# Patient Record
Sex: MALE | Race: WHITE | HISPANIC OR LATINO | Employment: FULL TIME | URBAN - METROPOLITAN AREA
[De-identification: names, ages, dates, MRNs, and addresses within clinical notes are randomized per-mention and may not be internally consistent; named-entity substitution may affect disease eponyms.]

---

## 2020-02-24 ENCOUNTER — APPOINTMENT (EMERGENCY)
Dept: RADIOLOGY | Facility: HOSPITAL | Age: 25
End: 2020-02-24
Payer: COMMERCIAL

## 2020-02-24 ENCOUNTER — HOSPITAL ENCOUNTER (EMERGENCY)
Facility: HOSPITAL | Age: 25
Discharge: HOME | End: 2020-02-25
Attending: EMERGENCY MEDICINE | Admitting: EMERGENCY MEDICINE
Payer: COMMERCIAL

## 2020-02-24 ENCOUNTER — APPOINTMENT (EMERGENCY)
Dept: CT IMAGING | Facility: HOSPITAL | Age: 25
End: 2020-02-24
Payer: COMMERCIAL

## 2020-02-24 VITALS
DIASTOLIC BLOOD PRESSURE: 72 MMHG | HEART RATE: 60 BPM | RESPIRATION RATE: 18 BRPM | SYSTOLIC BLOOD PRESSURE: 129 MMHG | WEIGHT: 270.28 LBS | OXYGEN SATURATION: 98 % | TEMPERATURE: 97.9 F

## 2020-02-24 DIAGNOSIS — S16.1XXA CERVICAL STRAIN: ICD-10-CM
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
## 2020-02-24 DIAGNOSIS — T14.8XXA CONTUSION: ICD-10-CM

## 2020-02-24 LAB
ALBUMIN SERPL BCP-MCNC: 3.5 G/DL (ref 3.5–5)
ALP SERPL-CCNC: 97 U/L (ref 46–116)
ALT SERPL W P-5'-P-CCNC: 21 U/L (ref 12–78)
ANION GAP SERPL CALCULATED.3IONS-SCNC: 6 MMOL/L (ref 4–13)
APTT PPP: 34 SECONDS (ref 23–37)
AST SERPL W P-5'-P-CCNC: 34 U/L (ref 5–45)
BASOPHILS # BLD AUTO: 0.04 THOUSANDS/ΜL (ref 0–0.1)
BASOPHILS NFR BLD AUTO: 1 % (ref 0–1)
BILIRUB SERPL-MCNC: 0.4 MG/DL (ref 0.2–1)
BILIRUB UR QL STRIP: NEGATIVE
BUN SERPL-MCNC: 17 MG/DL (ref 5–25)
CALCIUM SERPL-MCNC: 8.3 MG/DL (ref 8.3–10.1)
CHLORIDE SERPL-SCNC: 103 MMOL/L (ref 100–108)
CLARITY UR: CLEAR
CO2 SERPL-SCNC: 29 MMOL/L (ref 21–32)
COLOR UR: YELLOW
CREAT SERPL-MCNC: 1.07 MG/DL (ref 0.6–1.3)
EOSINOPHIL # BLD AUTO: 0.13 THOUSAND/ΜL (ref 0–0.61)
EOSINOPHIL NFR BLD AUTO: 2 % (ref 0–6)
ERYTHROCYTE [DISTWIDTH] IN BLOOD BY AUTOMATED COUNT: 11.9 % (ref 11.6–15.1)
GFR SERPL CREATININE-BSD FRML MDRD: 95 ML/MIN/1.73SQ M
GLUCOSE SERPL-MCNC: 93 MG/DL (ref 65–140)
GLUCOSE UR STRIP-MCNC: NEGATIVE MG/DL
HCT VFR BLD AUTO: 44.6 % (ref 36.5–49.3)
HGB BLD-MCNC: 15.1 G/DL (ref 12–17)
HGB UR QL STRIP.AUTO: NEGATIVE
IMM GRANULOCYTES # BLD AUTO: 0.02 THOUSAND/UL (ref 0–0.2)
IMM GRANULOCYTES NFR BLD AUTO: 0 % (ref 0–2)
INR PPP: 1.09 (ref 0.84–1.19)
KETONES UR STRIP-MCNC: NEGATIVE MG/DL
LEUKOCYTE ESTERASE UR QL STRIP: NEGATIVE
LYMPHOCYTES # BLD AUTO: 2.76 THOUSANDS/ΜL (ref 0.6–4.47)
LYMPHOCYTES NFR BLD AUTO: 36 % (ref 14–44)
MCH RBC QN AUTO: 28.9 PG (ref 26.8–34.3)
MCHC RBC AUTO-ENTMCNC: 33.9 G/DL (ref 31.4–37.4)
MCV RBC AUTO: 85 FL (ref 82–98)
MONOCYTES # BLD AUTO: 0.49 THOUSAND/ΜL (ref 0.17–1.22)
MONOCYTES NFR BLD AUTO: 6 % (ref 4–12)
NEUTROPHILS # BLD AUTO: 4.16 THOUSANDS/ΜL (ref 1.85–7.62)
NEUTS SEG NFR BLD AUTO: 55 % (ref 43–75)
NITRITE UR QL STRIP: NEGATIVE
NRBC BLD AUTO-RTO: 0 /100 WBCS
PH UR STRIP.AUTO: 7 [PH]
PLATELET # BLD AUTO: 203 THOUSANDS/UL (ref 149–390)
PMV BLD AUTO: 10 FL (ref 8.9–12.7)
POTASSIUM SERPL-SCNC: 3.9 MMOL/L (ref 3.5–5.3)
PROT SERPL-MCNC: 7 G/DL (ref 6.4–8.2)
PROT UR STRIP-MCNC: NEGATIVE MG/DL
PROTHROMBIN TIME: 14.2 SECONDS (ref 11.6–14.5)
RBC # BLD AUTO: 5.22 MILLION/UL (ref 3.88–5.62)
SODIUM SERPL-SCNC: 138 MMOL/L (ref 136–145)
SP GR UR STRIP.AUTO: 1.01 (ref 1–1.03)
TROPONIN I SERPL-MCNC: <0.02 NG/ML
UROBILINOGEN UR QL STRIP.AUTO: 0.2 E.U./DL
WBC # BLD AUTO: 7.6 THOUSAND/UL (ref 4.31–10.16)

## 2020-02-24 PROCEDURE — 70498 CT ANGIOGRAPHY NECK: CPT

## 2020-02-24 PROCEDURE — 85730 THROMBOPLASTIN TIME PARTIAL: CPT | Performed by: PHYSICIAN ASSISTANT

## 2020-02-24 PROCEDURE — 36415 COLL VENOUS BLD VENIPUNCTURE: CPT | Performed by: PHYSICIAN ASSISTANT

## 2020-02-24 PROCEDURE — 99285 EMERGENCY DEPT VISIT HI MDM: CPT

## 2020-02-24 PROCEDURE — 96360 HYDRATION IV INFUSION INIT: CPT

## 2020-02-24 PROCEDURE — 74177 CT ABD & PELVIS W/CONTRAST: CPT

## 2020-02-24 PROCEDURE — 85610 PROTHROMBIN TIME: CPT | Performed by: PHYSICIAN ASSISTANT

## 2020-02-24 PROCEDURE — 70450 CT HEAD/BRAIN W/O DYE: CPT

## 2020-02-24 PROCEDURE — 80053 COMPREHEN METABOLIC PANEL: CPT | Performed by: PHYSICIAN ASSISTANT

## 2020-02-24 PROCEDURE — 93005 ELECTROCARDIOGRAM TRACING: CPT

## 2020-02-24 PROCEDURE — 81003 URINALYSIS AUTO W/O SCOPE: CPT | Performed by: PHYSICIAN ASSISTANT

## 2020-02-24 PROCEDURE — 72125 CT NECK SPINE W/O DYE: CPT

## 2020-02-24 PROCEDURE — 85025 COMPLETE CBC W/AUTO DIFF WBC: CPT | Performed by: PHYSICIAN ASSISTANT

## 2020-02-24 PROCEDURE — 71260 CT THORAX DX C+: CPT

## 2020-02-24 PROCEDURE — 84484 ASSAY OF TROPONIN QUANT: CPT | Performed by: PHYSICIAN ASSISTANT

## 2020-02-24 PROCEDURE — 73080 X-RAY EXAM OF ELBOW: CPT

## 2020-02-24 RX ADMIN — IOHEXOL 85 ML: 350 INJECTION, SOLUTION INTRAVENOUS at 23:20

## 2020-02-24 RX ADMIN — SODIUM CHLORIDE 1000 ML: 0.9 INJECTION, SOLUTION INTRAVENOUS at 23:14

## 2020-02-24 RX ADMIN — IOHEXOL 100 ML: 350 INJECTION, SOLUTION INTRAVENOUS at 21:25
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
## 2020-02-25 PROBLEM — T14.8XXA CONTUSION: Status: ACTIVE | Noted: 2020-02-25

## 2020-02-25 PROBLEM — S16.1XXA CERVICAL STRAIN: Status: ACTIVE | Noted: 2020-02-25

## 2020-02-25 LAB
ATRIAL RATE: 69 BPM
P AXIS: 77 DEGREES
PR INTERVAL: 164 MS
QRS AXIS: 59 DEGREES
QRSD INTERVAL: 108 MS
QT INTERVAL: 368 MS
QTC INTERVAL: 394 MS
T WAVE AXIS: 56 DEGREES
VENTRICULAR RATE: 69 BPM

## 2020-02-25 PROCEDURE — 93010 ELECTROCARDIOGRAM REPORT: CPT | Performed by: INTERNAL MEDICINE

## 2020-02-25 PROCEDURE — 99285 EMERGENCY DEPT VISIT HI MDM: CPT | Performed by: PHYSICIAN ASSISTANT

## 2020-02-25 RX ORDER — NAPROXEN 500 MG/1
500 TABLET ORAL 2 TIMES DAILY WITH MEALS
Qty: 30 TABLET | Refills: 0 | Status: SHIPPED | OUTPATIENT
Start: 2020-02-25 | End: 2020-10-13
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
## 2020-10-09 ENCOUNTER — HOSPITAL ENCOUNTER (EMERGENCY)
Facility: HOSPITAL | Age: 25
Discharge: HOME | End: 2020-10-09
Attending: EMERGENCY MEDICINE | Admitting: EMERGENCY MEDICINE
Payer: COMMERCIAL

## 2020-10-09 ENCOUNTER — APPOINTMENT (EMERGENCY)
Dept: RADIOLOGY | Facility: HOSPITAL | Age: 25
End: 2020-10-09
Payer: COMMERCIAL

## 2020-10-09 VITALS
SYSTOLIC BLOOD PRESSURE: 136 MMHG | HEART RATE: 67 BPM | RESPIRATION RATE: 16 BRPM | BODY MASS INDEX: 31.29 KG/M2 | OXYGEN SATURATION: 100 % | DIASTOLIC BLOOD PRESSURE: 76 MMHG | HEIGHT: 76 IN | TEMPERATURE: 98 F | WEIGHT: 257 LBS

## 2020-10-09 DIAGNOSIS — S42.021A CLOSED DISPLACED FRACTURE OF SHAFT OF RIGHT CLAVICLE, INITIAL ENCOUNTER: Primary | ICD-10-CM

## 2020-10-09 DIAGNOSIS — S62.024A CLOSED NONDISPLACED FRACTURE OF MIDDLE THIRD OF SCAPHOID BONE OF RIGHT WRIST, INITIAL ENCOUNTER: ICD-10-CM

## 2020-10-09 PROCEDURE — 73000 X-RAY EXAM OF COLLAR BONE: CPT

## 2020-10-09 PROCEDURE — 99283 EMERGENCY DEPT VISIT LOW MDM: CPT

## 2020-10-09 PROCEDURE — 73110 X-RAY EXAM OF WRIST: CPT

## 2020-10-09 PROCEDURE — 29125 APPL SHORT ARM SPLINT STATIC: CPT | Performed by: EMERGENCY MEDICINE

## 2020-10-09 PROCEDURE — 99284 EMERGENCY DEPT VISIT MOD MDM: CPT | Performed by: EMERGENCY MEDICINE

## 2020-10-09 PROCEDURE — 73030 X-RAY EXAM OF SHOULDER: CPT

## 2020-10-09 RX ORDER — OXYCODONE HYDROCHLORIDE AND ACETAMINOPHEN 5; 325 MG/1; MG/1
1 TABLET ORAL EVERY 4 HOURS PRN
Qty: 20 TABLET | Refills: 0 | Status: SHIPPED | OUTPATIENT
Start: 2020-10-09 | End: 2020-12-08

## 2020-10-12 ENCOUNTER — TELEPHONE (OUTPATIENT)
Dept: OBGYN CLINIC | Facility: HOSPITAL | Age: 25
End: 2020-10-12

## 2020-10-13 ENCOUNTER — ANESTHESIA EVENT (OUTPATIENT)
Dept: PERIOP | Facility: HOSPITAL | Age: 25
End: 2020-10-13
Payer: COMMERCIAL

## 2020-10-13 ENCOUNTER — OFFICE VISIT (OUTPATIENT)
Dept: OBGYN CLINIC | Facility: CLINIC | Age: 25
End: 2020-10-13
Payer: COMMERCIAL

## 2020-10-13 VITALS
SYSTOLIC BLOOD PRESSURE: 134 MMHG | DIASTOLIC BLOOD PRESSURE: 77 MMHG | WEIGHT: 258.4 LBS | RESPIRATION RATE: 18 BRPM | HEIGHT: 76 IN | BODY MASS INDEX: 31.47 KG/M2 | TEMPERATURE: 97.4 F | HEART RATE: 63 BPM

## 2020-10-13 DIAGNOSIS — M25.511 ACUTE PAIN OF RIGHT SHOULDER: ICD-10-CM

## 2020-10-13 DIAGNOSIS — S42.031A CLOSED DISPLACED FRACTURE OF ACROMIAL END OF RIGHT CLAVICLE, INITIAL ENCOUNTER: Primary | ICD-10-CM

## 2020-10-13 DIAGNOSIS — S62.021A DISPLACED FRACTURE OF MIDDLE THIRD OF NAVICULAR (SCAPHOID) BONE OF RIGHT WRIST, INITIAL ENCOUNTER FOR CLOSED FRACTURE: ICD-10-CM

## 2020-10-13 PROCEDURE — 99203 OFFICE O/P NEW LOW 30 MIN: CPT | Performed by: ORTHOPAEDIC SURGERY

## 2020-10-14 ENCOUNTER — HOSPITAL ENCOUNTER (OUTPATIENT)
Facility: HOSPITAL | Age: 25
Setting detail: OUTPATIENT SURGERY
Discharge: HOME | End: 2020-10-14
Attending: ORTHOPAEDIC SURGERY | Admitting: ORTHOPAEDIC SURGERY
Payer: COMMERCIAL

## 2020-10-14 ENCOUNTER — APPOINTMENT (OUTPATIENT)
Dept: RADIOLOGY | Facility: HOSPITAL | Age: 25
End: 2020-10-14
Payer: COMMERCIAL

## 2020-10-14 ENCOUNTER — ANESTHESIA (OUTPATIENT)
Dept: PERIOP | Facility: HOSPITAL | Age: 25
End: 2020-10-14
Payer: COMMERCIAL

## 2020-10-14 VITALS
RESPIRATION RATE: 18 BRPM | OXYGEN SATURATION: 96 % | TEMPERATURE: 97.8 F | HEART RATE: 52 BPM | SYSTOLIC BLOOD PRESSURE: 136 MMHG | DIASTOLIC BLOOD PRESSURE: 79 MMHG

## 2020-10-14 VITALS — HEART RATE: 78 BPM

## 2020-10-14 DIAGNOSIS — S62.024A CLOSED NONDISPLACED FRACTURE OF MIDDLE THIRD OF SCAPHOID BONE OF RIGHT WRIST, INITIAL ENCOUNTER: Primary | ICD-10-CM

## 2020-10-14 PROCEDURE — 73100 X-RAY EXAM OF WRIST: CPT

## 2020-10-14 PROCEDURE — C1713 ANCHOR/SCREW BN/BN,TIS/BN: HCPCS | Performed by: ORTHOPAEDIC SURGERY

## 2020-10-14 PROCEDURE — 25628 OPTX CARPL SCPHD FX INT FIXJ: CPT | Performed by: PHYSICIAN ASSISTANT

## 2020-10-14 PROCEDURE — 25628 OPTX CARPL SCPHD FX INT FIXJ: CPT | Performed by: ORTHOPAEDIC SURGERY

## 2020-10-14 DEVICE — 24.0MM, MINI ACUTRAK 2® BONE SCREW
Type: IMPLANTABLE DEVICE | Site: WRIST | Status: FUNCTIONAL
Brand: ACUMED

## 2020-10-14 RX ORDER — LIDOCAINE HYDROCHLORIDE 20 MG/ML
INJECTION, SOLUTION INFILTRATION; PERINEURAL
Status: COMPLETED | OUTPATIENT
Start: 2020-10-14 | End: 2020-10-14

## 2020-10-14 RX ORDER — SODIUM CHLORIDE, SODIUM LACTATE, POTASSIUM CHLORIDE, CALCIUM CHLORIDE 600; 310; 30; 20 MG/100ML; MG/100ML; MG/100ML; MG/100ML
125 INJECTION, SOLUTION INTRAVENOUS CONTINUOUS
Status: DISCONTINUED | OUTPATIENT
Start: 2020-10-14 | End: 2020-10-14 | Stop reason: HOSPADM

## 2020-10-14 RX ORDER — MIDAZOLAM HYDROCHLORIDE 2 MG/2ML
INJECTION, SOLUTION INTRAMUSCULAR; INTRAVENOUS
Status: COMPLETED
Start: 2020-10-14 | End: 2020-10-14

## 2020-10-14 RX ORDER — MAGNESIUM HYDROXIDE 1200 MG/15ML
LIQUID ORAL AS NEEDED
Status: DISCONTINUED | OUTPATIENT
Start: 2020-10-14 | End: 2020-10-14 | Stop reason: HOSPADM

## 2020-10-14 RX ORDER — ACETAMINOPHEN 325 MG/1
650 TABLET ORAL EVERY 6 HOURS PRN
Status: DISCONTINUED | OUTPATIENT
Start: 2020-10-14 | End: 2020-10-14 | Stop reason: HOSPADM

## 2020-10-14 RX ORDER — LIDOCAINE HYDROCHLORIDE 10 MG/ML
INJECTION, SOLUTION EPIDURAL; INFILTRATION; INTRACAUDAL; PERINEURAL
Status: COMPLETED | OUTPATIENT
Start: 2020-10-14 | End: 2020-10-14

## 2020-10-14 RX ORDER — CEFAZOLIN SODIUM 2 G/50ML
2000 SOLUTION INTRAVENOUS ONCE
Status: COMPLETED | OUTPATIENT
Start: 2020-10-14 | End: 2020-10-14

## 2020-10-14 RX ORDER — ACETAMINOPHEN 500 MG
TABLET ORAL
Qty: 30 TABLET | Refills: 0 | Status: SHIPPED | OUTPATIENT
Start: 2020-10-14 | End: 2020-12-08

## 2020-10-14 RX ORDER — ONDANSETRON 2 MG/ML
4 INJECTION INTRAMUSCULAR; INTRAVENOUS ONCE AS NEEDED
Status: DISCONTINUED | OUTPATIENT
Start: 2020-10-14 | End: 2020-10-14 | Stop reason: HOSPADM

## 2020-10-14 RX ORDER — FENTANYL CITRATE 50 UG/ML
INJECTION, SOLUTION INTRAMUSCULAR; INTRAVENOUS
Status: COMPLETED
Start: 2020-10-14 | End: 2020-10-14

## 2020-10-14 RX ORDER — ONDANSETRON 4 MG/1
4 TABLET, ORALLY DISINTEGRATING ORAL EVERY 6 HOURS PRN
Qty: 20 TABLET | Refills: 0 | Status: SHIPPED | OUTPATIENT
Start: 2020-10-14 | End: 2020-12-08

## 2020-10-14 RX ORDER — METOCLOPRAMIDE HYDROCHLORIDE 5 MG/ML
10 INJECTION INTRAMUSCULAR; INTRAVENOUS ONCE AS NEEDED
Status: DISCONTINUED | OUTPATIENT
Start: 2020-10-14 | End: 2020-10-14 | Stop reason: HOSPADM

## 2020-10-14 RX ORDER — TRAMADOL HYDROCHLORIDE 50 MG/1
50 TABLET ORAL EVERY 6 HOURS PRN
Status: DISCONTINUED | OUTPATIENT
Start: 2020-10-14 | End: 2020-10-14 | Stop reason: HOSPADM

## 2020-10-14 RX ORDER — MIDAZOLAM HYDROCHLORIDE 2 MG/2ML
INJECTION, SOLUTION INTRAMUSCULAR; INTRAVENOUS AS NEEDED
Status: DISCONTINUED | OUTPATIENT
Start: 2020-10-14 | End: 2020-10-14

## 2020-10-14 RX ORDER — ONDANSETRON 2 MG/ML
4 INJECTION INTRAMUSCULAR; INTRAVENOUS EVERY 6 HOURS PRN
Status: DISCONTINUED | OUTPATIENT
Start: 2020-10-14 | End: 2020-10-14 | Stop reason: HOSPADM

## 2020-10-14 RX ORDER — FENTANYL CITRATE 50 UG/ML
INJECTION, SOLUTION INTRAMUSCULAR; INTRAVENOUS
Status: COMPLETED | OUTPATIENT
Start: 2020-10-14 | End: 2020-10-14

## 2020-10-14 RX ORDER — FENTANYL CITRATE 50 UG/ML
INJECTION, SOLUTION INTRAMUSCULAR; INTRAVENOUS AS NEEDED
Status: DISCONTINUED | OUTPATIENT
Start: 2020-10-14 | End: 2020-10-14

## 2020-10-14 RX ORDER — ROPIVACAINE HYDROCHLORIDE 5 MG/ML
INJECTION, SOLUTION EPIDURAL; INFILTRATION; PERINEURAL
Status: COMPLETED | OUTPATIENT
Start: 2020-10-14 | End: 2020-10-14

## 2020-10-14 RX ORDER — PROPOFOL 10 MG/ML
INJECTION, EMULSION INTRAVENOUS CONTINUOUS PRN
Status: DISCONTINUED | OUTPATIENT
Start: 2020-10-14 | End: 2020-10-14

## 2020-10-14 RX ORDER — IBUPROFEN 600 MG/1
TABLET ORAL
Qty: 30 TABLET | Refills: 0 | Status: SHIPPED | OUTPATIENT
Start: 2020-10-14 | End: 2020-12-08

## 2020-10-14 RX ORDER — PROPOFOL 10 MG/ML
INJECTION, EMULSION INTRAVENOUS AS NEEDED
Status: DISCONTINUED | OUTPATIENT
Start: 2020-10-14 | End: 2020-10-14

## 2020-10-14 RX ORDER — KETAMINE HYDROCHLORIDE 50 MG/ML
INJECTION, SOLUTION, CONCENTRATE INTRAMUSCULAR; INTRAVENOUS AS NEEDED
Status: DISCONTINUED | OUTPATIENT
Start: 2020-10-14 | End: 2020-10-14

## 2020-10-14 RX ORDER — CEFAZOLIN SODIUM 2 G/50ML
2000 SOLUTION INTRAVENOUS ONCE
Status: DISCONTINUED | OUTPATIENT
Start: 2020-10-19 | End: 2020-10-14 | Stop reason: HOSPADM

## 2020-10-14 RX ORDER — MIDAZOLAM HYDROCHLORIDE 2 MG/2ML
INJECTION, SOLUTION INTRAMUSCULAR; INTRAVENOUS
Status: COMPLETED | OUTPATIENT
Start: 2020-10-14 | End: 2020-10-14

## 2020-10-14 RX ADMIN — LIDOCAINE HYDROCHLORIDE 10 ML: 20 INJECTION, SOLUTION INFILTRATION; PERINEURAL at 07:45

## 2020-10-14 RX ADMIN — SODIUM CHLORIDE, SODIUM LACTATE, POTASSIUM CHLORIDE, AND CALCIUM CHLORIDE 125 ML/HR: .6; .31; .03; .02 INJECTION, SOLUTION INTRAVENOUS at 07:45

## 2020-10-14 RX ADMIN — MIDAZOLAM HYDROCHLORIDE 2 MG: 1 INJECTION, SOLUTION INTRAMUSCULAR; INTRAVENOUS at 08:06

## 2020-10-14 RX ADMIN — FENTANYL CITRATE 100 MCG: 50 INJECTION, SOLUTION INTRAMUSCULAR; INTRAVENOUS at 07:45

## 2020-10-14 RX ADMIN — MIDAZOLAM HYDROCHLORIDE 2 MG: 1 INJECTION, SOLUTION INTRAMUSCULAR; INTRAVENOUS at 07:45

## 2020-10-14 RX ADMIN — FENTANYL CITRATE 100 MCG: 50 INJECTION, SOLUTION INTRAMUSCULAR; INTRAVENOUS at 08:06

## 2020-10-14 RX ADMIN — ROPIVACAINE HYDROCHLORIDE 20 ML: 5 INJECTION, SOLUTION EPIDURAL; INFILTRATION; PERINEURAL at 07:45

## 2020-10-14 RX ADMIN — CEFAZOLIN SODIUM 2000 MG: 2 SOLUTION INTRAVENOUS at 07:58

## 2020-10-14 RX ADMIN — LIDOCAINE HYDROCHLORIDE 3 ML: 10 INJECTION, SOLUTION EPIDURAL; INFILTRATION; INTRACAUDAL; PERINEURAL at 07:45

## 2020-10-14 RX ADMIN — PROPOFOL 100 MCG/KG/MIN: 10 INJECTION, EMULSION INTRAVENOUS at 08:37

## 2020-10-14 RX ADMIN — PROPOFOL 50 MG: 10 INJECTION, EMULSION INTRAVENOUS at 08:37

## 2020-10-14 RX ADMIN — KETAMINE HYDROCHLORIDE 20 MG: 50 INJECTION INTRAMUSCULAR; INTRAVENOUS at 08:37

## 2020-10-18 ENCOUNTER — ANESTHESIA EVENT (OUTPATIENT)
Dept: PERIOP | Facility: HOSPITAL | Age: 25
End: 2020-10-18
Payer: COMMERCIAL

## 2020-10-19 ENCOUNTER — APPOINTMENT (OUTPATIENT)
Dept: RADIOLOGY | Facility: HOSPITAL | Age: 25
End: 2020-10-19
Payer: COMMERCIAL

## 2020-10-19 ENCOUNTER — ANESTHESIA (OUTPATIENT)
Dept: PERIOP | Facility: HOSPITAL | Age: 25
End: 2020-10-19
Payer: COMMERCIAL

## 2020-10-19 ENCOUNTER — HOSPITAL ENCOUNTER (OUTPATIENT)
Facility: HOSPITAL | Age: 25
Setting detail: OUTPATIENT SURGERY
Discharge: HOME | End: 2020-10-19
Attending: ORTHOPAEDIC SURGERY | Admitting: ORTHOPAEDIC SURGERY
Payer: COMMERCIAL

## 2020-10-19 VITALS
RESPIRATION RATE: 9 BRPM | OXYGEN SATURATION: 93 % | BODY MASS INDEX: 31.44 KG/M2 | TEMPERATURE: 98.5 F | WEIGHT: 258.16 LBS | HEIGHT: 76 IN | HEART RATE: 95 BPM | SYSTOLIC BLOOD PRESSURE: 141 MMHG | DIASTOLIC BLOOD PRESSURE: 63 MMHG

## 2020-10-19 VITALS — HEART RATE: 126 BPM

## 2020-10-19 PROCEDURE — C1713 ANCHOR/SCREW BN/BN,TIS/BN: HCPCS | Performed by: ORTHOPAEDIC SURGERY

## 2020-10-19 PROCEDURE — 23515 OPTX CLAVICULAR FX W/INT FIX: CPT | Performed by: ORTHOPAEDIC SURGERY

## 2020-10-19 PROCEDURE — 73000 X-RAY EXAM OF COLLAR BONE: CPT

## 2020-10-19 PROCEDURE — 23515 OPTX CLAVICULAR FX W/INT FIX: CPT | Performed by: PHYSICIAN ASSISTANT

## 2020-10-19 DEVICE — 3.5MM CORTEX SCREW SELF-TAPPING 18MM
Type: IMPLANTABLE DEVICE | Site: CLAVICLE | Status: NON-FUNCTIONAL
Removed: 2021-05-03

## 2020-10-19 DEVICE — 3.5MM LOCKING SCREW SLF-TPNG W/STARDRIVE(TM) RECESS 10MM
Type: IMPLANTABLE DEVICE | Site: CLAVICLE | Status: NON-FUNCTIONAL
Removed: 2021-05-03

## 2020-10-19 DEVICE — 3.5MM CORTEX SCREW SELF-TAPPING 16MM
Type: IMPLANTABLE DEVICE | Site: CLAVICLE | Status: NON-FUNCTIONAL
Removed: 2021-05-03

## 2020-10-19 DEVICE — 3.5MM CORTEX SCREW SELF-TAPPING 20MM
Type: IMPLANTABLE DEVICE | Site: CLAVICLE | Status: NON-FUNCTIONAL
Removed: 2021-05-03

## 2020-10-19 DEVICE — 3.5MM LOCKING SCREW SLF-TPNG W/STARDRIVE(TM) RECESS 18MM
Type: IMPLANTABLE DEVICE | Site: CLAVICLE | Status: NON-FUNCTIONAL
Removed: 2021-05-03

## 2020-10-19 DEVICE — 3.5MM LCP CLAVICLE HOOK PL 7H 18MM HOOK DEPTH/86MM/RT-STER
Type: IMPLANTABLE DEVICE | Site: CLAVICLE | Status: NON-FUNCTIONAL
Brand: LCP
Removed: 2021-05-03

## 2020-10-19 RX ORDER — NEOSTIGMINE METHYLSULFATE 1 MG/ML
INJECTION INTRAVENOUS AS NEEDED
Status: DISCONTINUED | OUTPATIENT
Start: 2020-10-19 | End: 2020-10-19

## 2020-10-19 RX ORDER — GLYCOPYRROLATE 0.2 MG/ML
INJECTION INTRAMUSCULAR; INTRAVENOUS AS NEEDED
Status: DISCONTINUED | OUTPATIENT
Start: 2020-10-19 | End: 2020-10-19

## 2020-10-19 RX ORDER — DEXAMETHASONE SODIUM PHOSPHATE 4 MG/ML
INJECTION, SOLUTION INTRA-ARTICULAR; INTRALESIONAL; INTRAMUSCULAR; INTRAVENOUS; SOFT TISSUE AS NEEDED
Status: DISCONTINUED | OUTPATIENT
Start: 2020-10-19 | End: 2020-10-19

## 2020-10-19 RX ORDER — ROCURONIUM BROMIDE 10 MG/ML
INJECTION, SOLUTION INTRAVENOUS AS NEEDED
Status: DISCONTINUED | OUTPATIENT
Start: 2020-10-19 | End: 2020-10-19

## 2020-10-19 RX ORDER — CEFAZOLIN SODIUM 2 G/50ML
2000 SOLUTION INTRAVENOUS ONCE
Status: COMPLETED | OUTPATIENT
Start: 2020-10-19 | End: 2020-10-19

## 2020-10-19 RX ORDER — CEFAZOLIN SODIUM 1 G/3ML
INJECTION, POWDER, FOR SOLUTION INTRAMUSCULAR; INTRAVENOUS AS NEEDED
Status: DISCONTINUED | OUTPATIENT
Start: 2020-10-19 | End: 2020-10-19

## 2020-10-19 RX ORDER — SUCCINYLCHOLINE/SOD CL,ISO/PF 100 MG/5ML
SYRINGE (ML) INTRAVENOUS AS NEEDED
Status: DISCONTINUED | OUTPATIENT
Start: 2020-10-19 | End: 2020-10-19

## 2020-10-19 RX ORDER — METOCLOPRAMIDE HYDROCHLORIDE 5 MG/ML
10 INJECTION INTRAMUSCULAR; INTRAVENOUS ONCE
Status: COMPLETED | OUTPATIENT
Start: 2020-10-19 | End: 2020-10-19

## 2020-10-19 RX ORDER — LIDOCAINE HYDROCHLORIDE 10 MG/ML
0.5 INJECTION, SOLUTION EPIDURAL; INFILTRATION; INTRACAUDAL; PERINEURAL ONCE AS NEEDED
Status: DISCONTINUED | OUTPATIENT
Start: 2020-10-19 | End: 2020-10-19 | Stop reason: HOSPADM

## 2020-10-19 RX ORDER — KETAMINE HCL IN NACL, ISO-OSM 100MG/10ML
SYRINGE (ML) INJECTION AS NEEDED
Status: DISCONTINUED | OUTPATIENT
Start: 2020-10-19 | End: 2020-10-19

## 2020-10-19 RX ORDER — MEPERIDINE HYDROCHLORIDE 50 MG/ML
12.5 INJECTION INTRAMUSCULAR; INTRAVENOUS; SUBCUTANEOUS
Status: DISCONTINUED | OUTPATIENT
Start: 2020-10-19 | End: 2020-10-19 | Stop reason: HOSPADM

## 2020-10-19 RX ORDER — MAGNESIUM HYDROXIDE 1200 MG/15ML
LIQUID ORAL AS NEEDED
Status: DISCONTINUED | OUTPATIENT
Start: 2020-10-19 | End: 2020-10-19 | Stop reason: HOSPADM

## 2020-10-19 RX ORDER — ONDANSETRON 2 MG/ML
INJECTION INTRAMUSCULAR; INTRAVENOUS AS NEEDED
Status: DISCONTINUED | OUTPATIENT
Start: 2020-10-19 | End: 2020-10-19

## 2020-10-19 RX ORDER — PROPOFOL 10 MG/ML
INJECTION, EMULSION INTRAVENOUS AS NEEDED
Status: DISCONTINUED | OUTPATIENT
Start: 2020-10-19 | End: 2020-10-19

## 2020-10-19 RX ORDER — FENTANYL CITRATE 50 UG/ML
INJECTION, SOLUTION INTRAMUSCULAR; INTRAVENOUS AS NEEDED
Status: DISCONTINUED | OUTPATIENT
Start: 2020-10-19 | End: 2020-10-19

## 2020-10-19 RX ORDER — SODIUM CHLORIDE, SODIUM LACTATE, POTASSIUM CHLORIDE, CALCIUM CHLORIDE 600; 310; 30; 20 MG/100ML; MG/100ML; MG/100ML; MG/100ML
125 INJECTION, SOLUTION INTRAVENOUS CONTINUOUS
Status: DISCONTINUED | OUTPATIENT
Start: 2020-10-19 | End: 2020-10-19 | Stop reason: HOSPADM

## 2020-10-19 RX ORDER — HYDROMORPHONE HCL/PF 1 MG/ML
0.5 SYRINGE (ML) INJECTION
Status: DISCONTINUED | OUTPATIENT
Start: 2020-10-19 | End: 2020-10-19 | Stop reason: HOSPADM

## 2020-10-19 RX ORDER — FENTANYL CITRATE/PF 50 MCG/ML
25 SYRINGE (ML) INJECTION
Status: DISCONTINUED | OUTPATIENT
Start: 2020-10-19 | End: 2020-10-19 | Stop reason: HOSPADM

## 2020-10-19 RX ORDER — BUPIVACAINE HYDROCHLORIDE 2.5 MG/ML
INJECTION, SOLUTION EPIDURAL; INFILTRATION; INTRACAUDAL AS NEEDED
Status: DISCONTINUED | OUTPATIENT
Start: 2020-10-19 | End: 2020-10-19 | Stop reason: HOSPADM

## 2020-10-19 RX ORDER — MIDAZOLAM HYDROCHLORIDE 2 MG/2ML
INJECTION, SOLUTION INTRAMUSCULAR; INTRAVENOUS AS NEEDED
Status: DISCONTINUED | OUTPATIENT
Start: 2020-10-19 | End: 2020-10-19

## 2020-10-19 RX ORDER — ONDANSETRON 2 MG/ML
4 INJECTION INTRAMUSCULAR; INTRAVENOUS ONCE AS NEEDED
Status: DISCONTINUED | OUTPATIENT
Start: 2020-10-19 | End: 2020-10-19 | Stop reason: HOSPADM

## 2020-10-19 RX ADMIN — DEXAMETHASONE SODIUM PHOSPHATE 4 MG: 4 INJECTION, SOLUTION INTRAMUSCULAR; INTRAVENOUS at 08:03

## 2020-10-19 RX ADMIN — NEOSTIGMINE METHYLSULFATE 4 MG: 1 INJECTION, SOLUTION INTRAVENOUS at 11:50

## 2020-10-19 RX ADMIN — GLYCOPYRROLATE 0.8 MG: 0.2 INJECTION, SOLUTION INTRAMUSCULAR; INTRAVENOUS at 11:50

## 2020-10-19 RX ADMIN — FENTANYL CITRATE 25 MCG: 50 INJECTION INTRAMUSCULAR; INTRAVENOUS at 12:56

## 2020-10-19 RX ADMIN — ROCURONIUM BROMIDE 10 MG: 10 SOLUTION INTRAVENOUS at 09:27

## 2020-10-19 RX ADMIN — Medication 30 MG: at 08:38

## 2020-10-19 RX ADMIN — ROCURONIUM BROMIDE 20 MG: 10 SOLUTION INTRAVENOUS at 09:55

## 2020-10-19 RX ADMIN — FENTANYL CITRATE 25 MCG: 50 INJECTION, SOLUTION INTRAMUSCULAR; INTRAVENOUS at 11:34

## 2020-10-19 RX ADMIN — METOCLOPRAMIDE HYDROCHLORIDE 10 MG: 5 INJECTION INTRAMUSCULAR; INTRAVENOUS at 13:36

## 2020-10-19 RX ADMIN — SODIUM CHLORIDE, SODIUM LACTATE, POTASSIUM CHLORIDE, AND CALCIUM CHLORIDE 125 ML/HR: .6; .31; .03; .02 INJECTION, SOLUTION INTRAVENOUS at 12:00

## 2020-10-19 RX ADMIN — MIDAZOLAM HYDROCHLORIDE 2 MG: 1 INJECTION, SOLUTION INTRAMUSCULAR; INTRAVENOUS at 08:00

## 2020-10-19 RX ADMIN — ROCURONIUM BROMIDE 20 MG: 10 SOLUTION INTRAVENOUS at 08:42

## 2020-10-19 RX ADMIN — FENTANYL CITRATE 100 MCG: 50 INJECTION, SOLUTION INTRAMUSCULAR; INTRAVENOUS at 08:03

## 2020-10-19 RX ADMIN — SODIUM CHLORIDE, SODIUM LACTATE, POTASSIUM CHLORIDE, AND CALCIUM CHLORIDE: .6; .31; .03; .02 INJECTION, SOLUTION INTRAVENOUS at 09:27

## 2020-10-19 RX ADMIN — ROCURONIUM BROMIDE 30 MG: 10 SOLUTION INTRAVENOUS at 08:10

## 2020-10-19 RX ADMIN — SODIUM CHLORIDE, SODIUM LACTATE, POTASSIUM CHLORIDE, AND CALCIUM CHLORIDE: .6; .31; .03; .02 INJECTION, SOLUTION INTRAVENOUS at 07:48

## 2020-10-19 RX ADMIN — ROCURONIUM BROMIDE 10 MG: 10 SOLUTION INTRAVENOUS at 10:25

## 2020-10-19 RX ADMIN — CEFAZOLIN SODIUM 2000 MG: 2 SOLUTION INTRAVENOUS at 08:06

## 2020-10-19 RX ADMIN — ROCURONIUM BROMIDE 10 MG: 10 SOLUTION INTRAVENOUS at 10:57

## 2020-10-19 RX ADMIN — ONDANSETRON 4 MG: 2 INJECTION INTRAMUSCULAR; INTRAVENOUS at 11:27

## 2020-10-19 RX ADMIN — FENTANYL CITRATE 50 MCG: 50 INJECTION, SOLUTION INTRAMUSCULAR; INTRAVENOUS at 11:22

## 2020-10-19 RX ADMIN — Medication 20 MG: at 10:10

## 2020-10-19 RX ADMIN — CEFAZOLIN SODIUM 2000 MG: 1 INJECTION, POWDER, FOR SOLUTION INTRAMUSCULAR; INTRAVENOUS at 11:33

## 2020-10-19 RX ADMIN — ROCURONIUM BROMIDE 10 MG: 10 SOLUTION INTRAVENOUS at 10:39

## 2020-10-19 RX ADMIN — Medication 120 MG: at 08:03

## 2020-10-19 RX ADMIN — PROPOFOL 250 MG: 10 INJECTION, EMULSION INTRAVENOUS at 08:03

## 2020-10-19 RX ADMIN — ONDANSETRON 4 MG: 2 INJECTION INTRAMUSCULAR; INTRAVENOUS at 08:00

## 2020-10-22 ENCOUNTER — APPOINTMENT (OUTPATIENT)
Dept: RADIOLOGY | Facility: CLINIC | Age: 25
End: 2020-10-22
Payer: COMMERCIAL

## 2020-10-22 ENCOUNTER — OFFICE VISIT (OUTPATIENT)
Dept: OBGYN CLINIC | Facility: CLINIC | Age: 25
End: 2020-10-22

## 2020-10-22 VITALS
BODY MASS INDEX: 31.42 KG/M2 | DIASTOLIC BLOOD PRESSURE: 78 MMHG | SYSTOLIC BLOOD PRESSURE: 150 MMHG | WEIGHT: 258 LBS | HEIGHT: 76 IN | HEART RATE: 72 BPM

## 2020-10-22 DIAGNOSIS — Z51.89 AFTER CARE: ICD-10-CM

## 2020-10-22 DIAGNOSIS — S62.021D CLOSED DISPLACED FRACTURE OF MIDDLE THIRD OF SCAPHOID OF RIGHT WRIST WITH ROUTINE HEALING, SUBSEQUENT ENCOUNTER: Primary | ICD-10-CM

## 2020-10-22 DIAGNOSIS — S62.021A DISPLACED FRACTURE OF MIDDLE THIRD OF NAVICULAR (SCAPHOID) BONE OF RIGHT WRIST, INITIAL ENCOUNTER FOR CLOSED FRACTURE: ICD-10-CM

## 2020-10-22 PROCEDURE — 73110 X-RAY EXAM OF WRIST: CPT

## 2020-10-22 PROCEDURE — 99024 POSTOP FOLLOW-UP VISIT: CPT | Performed by: ORTHOPAEDIC SURGERY

## 2020-10-27 ENCOUNTER — TELEPHONE (OUTPATIENT)
Dept: OBGYN CLINIC | Facility: HOSPITAL | Age: 25
End: 2020-10-27

## 2020-11-03 ENCOUNTER — OFFICE VISIT (OUTPATIENT)
Dept: OBGYN CLINIC | Facility: CLINIC | Age: 25
End: 2020-11-03

## 2020-11-03 VITALS
TEMPERATURE: 96.8 F | SYSTOLIC BLOOD PRESSURE: 145 MMHG | BODY MASS INDEX: 31.29 KG/M2 | HEART RATE: 88 BPM | DIASTOLIC BLOOD PRESSURE: 88 MMHG | WEIGHT: 257 LBS | HEIGHT: 76 IN

## 2020-11-03 DIAGNOSIS — S42.031D CLOSED DISPLACED FRACTURE OF ACROMIAL END OF RIGHT CLAVICLE WITH ROUTINE HEALING, SUBSEQUENT ENCOUNTER: ICD-10-CM

## 2020-11-03 DIAGNOSIS — Z87.81 S/P ORIF (OPEN REDUCTION INTERNAL FIXATION) FRACTURE: Primary | ICD-10-CM

## 2020-11-03 DIAGNOSIS — Z98.890 S/P ORIF (OPEN REDUCTION INTERNAL FIXATION) FRACTURE: Primary | ICD-10-CM

## 2020-11-03 PROCEDURE — 99024 POSTOP FOLLOW-UP VISIT: CPT | Performed by: ORTHOPAEDIC SURGERY

## 2020-11-03 RX ORDER — IBUPROFEN 600 MG/1
600 TABLET ORAL EVERY 6 HOURS PRN
Qty: 30 TABLET | Refills: 0 | Status: SHIPPED | OUTPATIENT
Start: 2020-11-03 | End: 2021-01-07

## 2020-11-11 ENCOUNTER — TELEPHONE (OUTPATIENT)
Dept: PHYSICAL THERAPY | Facility: CLINIC | Age: 25
End: 2020-11-11

## 2020-11-18 ENCOUNTER — EVALUATION (OUTPATIENT)
Dept: PHYSICAL THERAPY | Facility: CLINIC | Age: 25
End: 2020-11-18
Payer: COMMERCIAL

## 2020-11-18 DIAGNOSIS — Z98.890 S/P ORIF (OPEN REDUCTION INTERNAL FIXATION) FRACTURE: ICD-10-CM

## 2020-11-18 DIAGNOSIS — Z87.81 S/P ORIF (OPEN REDUCTION INTERNAL FIXATION) FRACTURE: ICD-10-CM

## 2020-11-18 DIAGNOSIS — S42.031D CLOSED DISPLACED FRACTURE OF ACROMIAL END OF RIGHT CLAVICLE WITH ROUTINE HEALING, SUBSEQUENT ENCOUNTER: Primary | ICD-10-CM

## 2020-11-18 PROCEDURE — 97161 PT EVAL LOW COMPLEX 20 MIN: CPT | Performed by: PHYSICAL THERAPIST

## 2020-11-18 PROCEDURE — 97110 THERAPEUTIC EXERCISES: CPT | Performed by: PHYSICAL THERAPIST

## 2020-11-24 ENCOUNTER — OFFICE VISIT (OUTPATIENT)
Dept: PHYSICAL THERAPY | Facility: CLINIC | Age: 25
End: 2020-11-24
Payer: COMMERCIAL

## 2020-11-24 DIAGNOSIS — Z87.81 S/P ORIF (OPEN REDUCTION INTERNAL FIXATION) FRACTURE: ICD-10-CM

## 2020-11-24 DIAGNOSIS — S42.031D CLOSED DISPLACED FRACTURE OF ACROMIAL END OF RIGHT CLAVICLE WITH ROUTINE HEALING, SUBSEQUENT ENCOUNTER: Primary | ICD-10-CM

## 2020-11-24 DIAGNOSIS — Z98.890 S/P ORIF (OPEN REDUCTION INTERNAL FIXATION) FRACTURE: ICD-10-CM

## 2020-11-24 PROCEDURE — 97110 THERAPEUTIC EXERCISES: CPT

## 2020-11-30 DIAGNOSIS — Z98.890 S/P ORIF (OPEN REDUCTION INTERNAL FIXATION) FRACTURE: Primary | ICD-10-CM

## 2020-11-30 DIAGNOSIS — Z87.81 S/P ORIF (OPEN REDUCTION INTERNAL FIXATION) FRACTURE: Primary | ICD-10-CM

## 2020-12-01 ENCOUNTER — APPOINTMENT (OUTPATIENT)
Dept: RADIOLOGY | Facility: CLINIC | Age: 25
End: 2020-12-01
Payer: COMMERCIAL

## 2020-12-01 ENCOUNTER — OFFICE VISIT (OUTPATIENT)
Dept: OBGYN CLINIC | Facility: CLINIC | Age: 25
End: 2020-12-01

## 2020-12-01 VITALS
DIASTOLIC BLOOD PRESSURE: 80 MMHG | BODY MASS INDEX: 32.81 KG/M2 | HEIGHT: 76 IN | SYSTOLIC BLOOD PRESSURE: 146 MMHG | HEART RATE: 75 BPM | WEIGHT: 269.4 LBS

## 2020-12-01 DIAGNOSIS — Z87.81 S/P ORIF (OPEN REDUCTION INTERNAL FIXATION) FRACTURE: ICD-10-CM

## 2020-12-01 DIAGNOSIS — Z87.81 S/P ORIF (OPEN REDUCTION INTERNAL FIXATION) FRACTURE: Primary | ICD-10-CM

## 2020-12-01 DIAGNOSIS — Z98.890 S/P ORIF (OPEN REDUCTION INTERNAL FIXATION) FRACTURE: Primary | ICD-10-CM

## 2020-12-01 DIAGNOSIS — Z98.890 S/P ORIF (OPEN REDUCTION INTERNAL FIXATION) FRACTURE: ICD-10-CM

## 2020-12-01 PROCEDURE — 99024 POSTOP FOLLOW-UP VISIT: CPT | Performed by: ORTHOPAEDIC SURGERY

## 2020-12-01 PROCEDURE — 73000 X-RAY EXAM OF COLLAR BONE: CPT

## 2020-12-02 ENCOUNTER — OFFICE VISIT (OUTPATIENT)
Dept: PHYSICAL THERAPY | Facility: CLINIC | Age: 25
End: 2020-12-02
Payer: COMMERCIAL

## 2020-12-02 DIAGNOSIS — S42.031D CLOSED DISPLACED FRACTURE OF ACROMIAL END OF RIGHT CLAVICLE WITH ROUTINE HEALING, SUBSEQUENT ENCOUNTER: Primary | ICD-10-CM

## 2020-12-02 DIAGNOSIS — Z87.81 S/P ORIF (OPEN REDUCTION INTERNAL FIXATION) FRACTURE: ICD-10-CM

## 2020-12-02 DIAGNOSIS — Z98.890 S/P ORIF (OPEN REDUCTION INTERNAL FIXATION) FRACTURE: ICD-10-CM

## 2020-12-02 PROCEDURE — 97110 THERAPEUTIC EXERCISES: CPT | Performed by: PHYSICAL THERAPIST

## 2020-12-08 ENCOUNTER — OFFICE VISIT (OUTPATIENT)
Dept: OBGYN CLINIC | Facility: CLINIC | Age: 25
End: 2020-12-08

## 2020-12-08 ENCOUNTER — APPOINTMENT (OUTPATIENT)
Dept: RADIOLOGY | Facility: CLINIC | Age: 25
End: 2020-12-08
Payer: COMMERCIAL

## 2020-12-08 VITALS
BODY MASS INDEX: 32.81 KG/M2 | SYSTOLIC BLOOD PRESSURE: 147 MMHG | HEART RATE: 66 BPM | HEIGHT: 76 IN | DIASTOLIC BLOOD PRESSURE: 82 MMHG | WEIGHT: 269.4 LBS

## 2020-12-08 DIAGNOSIS — Z98.890 S/P ORIF (OPEN REDUCTION INTERNAL FIXATION) FRACTURE: ICD-10-CM

## 2020-12-08 DIAGNOSIS — Z98.890 S/P ORIF (OPEN REDUCTION INTERNAL FIXATION) FRACTURE: Primary | ICD-10-CM

## 2020-12-08 DIAGNOSIS — Z87.81 S/P ORIF (OPEN REDUCTION INTERNAL FIXATION) FRACTURE: ICD-10-CM

## 2020-12-08 DIAGNOSIS — Z87.81 S/P ORIF (OPEN REDUCTION INTERNAL FIXATION) FRACTURE: Primary | ICD-10-CM

## 2020-12-08 PROCEDURE — 99024 POSTOP FOLLOW-UP VISIT: CPT | Performed by: ORTHOPAEDIC SURGERY

## 2020-12-08 PROCEDURE — 73110 X-RAY EXAM OF WRIST: CPT

## 2020-12-09 ENCOUNTER — APPOINTMENT (OUTPATIENT)
Dept: PHYSICAL THERAPY | Facility: CLINIC | Age: 25
End: 2020-12-09
Payer: COMMERCIAL

## 2020-12-10 ENCOUNTER — OFFICE VISIT (OUTPATIENT)
Dept: PHYSICAL THERAPY | Facility: CLINIC | Age: 25
End: 2020-12-10
Payer: COMMERCIAL

## 2020-12-10 DIAGNOSIS — Z87.81 S/P ORIF (OPEN REDUCTION INTERNAL FIXATION) FRACTURE: ICD-10-CM

## 2020-12-10 DIAGNOSIS — Z98.890 S/P ORIF (OPEN REDUCTION INTERNAL FIXATION) FRACTURE: ICD-10-CM

## 2020-12-10 DIAGNOSIS — S42.031D CLOSED DISPLACED FRACTURE OF ACROMIAL END OF RIGHT CLAVICLE WITH ROUTINE HEALING, SUBSEQUENT ENCOUNTER: Primary | ICD-10-CM

## 2020-12-10 PROCEDURE — 97140 MANUAL THERAPY 1/> REGIONS: CPT

## 2020-12-10 PROCEDURE — 97110 THERAPEUTIC EXERCISES: CPT

## 2020-12-16 ENCOUNTER — APPOINTMENT (OUTPATIENT)
Dept: PHYSICAL THERAPY | Facility: CLINIC | Age: 25
End: 2020-12-16
Payer: COMMERCIAL

## 2020-12-17 ENCOUNTER — TELEPHONE (OUTPATIENT)
Dept: PHYSICAL THERAPY | Facility: CLINIC | Age: 25
End: 2020-12-17

## 2020-12-31 ENCOUNTER — TELEPHONE (OUTPATIENT)
Dept: OBGYN CLINIC | Facility: CLINIC | Age: 25
End: 2020-12-31

## 2021-01-05 ENCOUNTER — HOSPITAL ENCOUNTER (OUTPATIENT)
Dept: CT IMAGING | Facility: HOSPITAL | Age: 26
Discharge: HOME | End: 2021-01-05
Payer: COMMERCIAL

## 2021-01-05 DIAGNOSIS — Z98.890 S/P ORIF (OPEN REDUCTION INTERNAL FIXATION) FRACTURE: ICD-10-CM

## 2021-01-05 DIAGNOSIS — Z87.81 S/P ORIF (OPEN REDUCTION INTERNAL FIXATION) FRACTURE: ICD-10-CM

## 2021-01-05 PROCEDURE — 73200 CT UPPER EXTREMITY W/O DYE: CPT

## 2021-01-07 ENCOUNTER — OFFICE VISIT (OUTPATIENT)
Dept: OBGYN CLINIC | Facility: CLINIC | Age: 26
End: 2021-01-07

## 2021-01-07 VITALS
BODY MASS INDEX: 32.81 KG/M2 | SYSTOLIC BLOOD PRESSURE: 145 MMHG | DIASTOLIC BLOOD PRESSURE: 78 MMHG | HEART RATE: 70 BPM | HEIGHT: 76 IN | WEIGHT: 269.4 LBS

## 2021-01-07 DIAGNOSIS — S62.021D CLOSED DISPLACED FRACTURE OF MIDDLE THIRD OF SCAPHOID OF RIGHT WRIST WITH ROUTINE HEALING, SUBSEQUENT ENCOUNTER: ICD-10-CM

## 2021-01-07 DIAGNOSIS — Z98.890 S/P ORIF (OPEN REDUCTION INTERNAL FIXATION) FRACTURE: Primary | ICD-10-CM

## 2021-01-07 DIAGNOSIS — Z87.81 S/P ORIF (OPEN REDUCTION INTERNAL FIXATION) FRACTURE: Primary | ICD-10-CM

## 2021-01-07 PROCEDURE — 99024 POSTOP FOLLOW-UP VISIT: CPT | Performed by: ORTHOPAEDIC SURGERY

## 2021-01-07 NOTE — LETTER
January 7, 2021     Patient: Kalli Plasencia   YOB: 1995   Date of Visit: 1/7/2021       To Whom it May Concern:    Kalli Plasencia is under my professional care  He was seen in my office on 1/7/2021  He will undergo work hardening program for the next two weeks, with expected return to work beginning 1/21/2021    If you have any questions or concerns, please don't hesitate to call           Sincerely,          Shannan Waldrop MD        CC: No Recipients

## 2021-01-07 NOTE — PROGRESS NOTES
SUBJECTIVE:  Traci Vizcarra is a 30y o  year old male who presents for follow up s/p ORIF right scaphoid performed on 10/19/2020  Today patient has mild stiffness and soreness of the right wrist   He denies any recent bruising, swelling, numbness, tingling, feelings of instability, or mechanical symptoms  VITALS:  Vitals:    01/07/21 1135   BP: 145/78   Pulse: 70       PHYSICAL EXAMINATION:  General: well developed and well nourished, alert, oriented times 3 and appears comfortable  Psychiatric: Normal    MUSCULOSKELETAL EXAMINATION:  Right wrist  Incision: clean, dry and healed  Range of Motion:  Demonstrates mild stiffness with flexion extension as compared to contralateral upper extremity  Neurovascular status: Neuro intact, good cap refill, cap refill intact and radial pulse 2+      STUDIES REVIEWED:  Attending Physician has personally reviewed pertinent imaging in PACS, impression is as follows:    Review of CT series taken 1/5/2021 of the right wrist shows healed scaphoid fracture with intact hardware and no signs of loosening      PROCEDURES PERFORMED:  Procedures  No Procedures performed today      ASSESSMENT/PLAN:    S/p ORIF right scaphoid - 10/14/2020  · Patient is progressing well postoperatively  · Referral to occupational therapy for work hardening program  · Continue NSAIDs/Tylenol as needed for pain and soreness  · Provided a note to return to work beginning 1/21/2021    FOLLOW UP:  Return if symptoms worsen or fail to improve        TO DO AT NEXT VISIT:  Re-evaluation of current issue      Scribe Attestation    I,:  Raquel Roman am acting as a scribe while in the presence of the attending physician :       I,:  Joe Tripathi MD personally performed the services described in this documentation    as scribed in my presence :

## 2021-01-10 DIAGNOSIS — Z98.890 S/P ORIF (OPEN REDUCTION INTERNAL FIXATION) FRACTURE: Primary | ICD-10-CM

## 2021-01-10 DIAGNOSIS — Z87.81 S/P ORIF (OPEN REDUCTION INTERNAL FIXATION) FRACTURE: Primary | ICD-10-CM

## 2021-01-12 ENCOUNTER — APPOINTMENT (OUTPATIENT)
Dept: RADIOLOGY | Facility: CLINIC | Age: 26
End: 2021-01-12
Payer: COMMERCIAL

## 2021-01-12 ENCOUNTER — OFFICE VISIT (OUTPATIENT)
Dept: OBGYN CLINIC | Facility: CLINIC | Age: 26
End: 2021-01-12

## 2021-01-12 VITALS
BODY MASS INDEX: 33.36 KG/M2 | DIASTOLIC BLOOD PRESSURE: 80 MMHG | WEIGHT: 274 LBS | HEIGHT: 76 IN | HEART RATE: 62 BPM | SYSTOLIC BLOOD PRESSURE: 135 MMHG

## 2021-01-12 DIAGNOSIS — Z87.81 S/P ORIF (OPEN REDUCTION INTERNAL FIXATION) FRACTURE: ICD-10-CM

## 2021-01-12 DIAGNOSIS — Z87.81 S/P ORIF (OPEN REDUCTION INTERNAL FIXATION) FRACTURE: Primary | ICD-10-CM

## 2021-01-12 DIAGNOSIS — S42.031D CLOSED DISPLACED FRACTURE OF ACROMIAL END OF RIGHT CLAVICLE WITH ROUTINE HEALING, SUBSEQUENT ENCOUNTER: ICD-10-CM

## 2021-01-12 DIAGNOSIS — Z98.890 S/P ORIF (OPEN REDUCTION INTERNAL FIXATION) FRACTURE: ICD-10-CM

## 2021-01-12 DIAGNOSIS — Z98.890 S/P ORIF (OPEN REDUCTION INTERNAL FIXATION) FRACTURE: Primary | ICD-10-CM

## 2021-01-12 PROCEDURE — 99024 POSTOP FOLLOW-UP VISIT: CPT | Performed by: ORTHOPAEDIC SURGERY

## 2021-01-12 PROCEDURE — 73000 X-RAY EXAM OF COLLAR BONE: CPT

## 2021-01-12 NOTE — PATIENT INSTRUCTIONS
Weightbearing as tolerated on the right upper extremity  Call the office if you need a note clearing you for work from Dr Kelsey Deleon  Follow-up in 4 weeks, we will take new x-rays at that time

## 2021-01-12 NOTE — PROGRESS NOTES
Orthopaedics Office Visit - Post-op Patient Visit    ASSESSMENT/PLAN:    Assessment:   3 months S/P open reduction, internal fixation right clavicle fracture    Plan:   WBAT on the RUE  May continue overhead activities as tolerated, patient has been cleared for work with no restrictions by Dr Salina Bronson  Patient has attended some formal physical therapy, however his insurance will no longer cover any visits  Patient was advised that his hardware could not be removed until he is at least 6 months postop, however may have to be removed sooner rather than later if hardware begins to erode the acromion  6 months minimum, possible to 9 months  Patient may continue Motrin/Tylenol as needed for pain, ice/heat as needed for pain  Follow-up in 4 weeks with repeat xray to ensure no further erosion at acromion    To Do Next Visit:  Repeat x-rays of the right clavicle    _____________________________________________________  CHIEF COMPLAINT:  Chief Complaint   Patient presents with    Right Shoulder - Follow-up         SUBJECTIVE:  Sydnee Bowers is a 30 y o  male who presents 3 months status post open reduction internal fixation of right clavicle fracture  Patient states that his shoulder is doing well overall  He was attending formal physical therapy, however his insurance stopped covering his visits  He does notice significant improvement in his ROM  He has not been taking any medications for pain  SOCIAL HISTORY:  Social History     Tobacco Use    Smoking status: Never Smoker    Smokeless tobacco: Never Used   Substance Use Topics    Alcohol use: Yes     Frequency: Monthly or less     Comment: OCCAS    Drug use: Never       MEDICATIONS:  No current outpatient medications on file  REVIEW OF SYSTEMS:  MSK:  Right clavicle  Neuro:  Normal  Pertinent items are otherwise noted in HPI    A comprehensive review of systems was otherwise negative     _____________________________________________________  PHYSICAL EXAMINATION:  Vital signs: /80   Pulse 62   Ht 6' 4" (1 93 m)   Wt 124 kg (274 lb)   BMI 33 35 kg/m²   General: No acute distress, awake and alert  Psychiatric: Mood and affect appear appropriate  HEENT: Trachea Midline, No torticollis, no apparent facial trauma  Cardiovascular: No audible murmurs; Extremities appear perfused  Pulmonary: No audible wheezing or stridor  Skin: No open lesions; see further details (if any) below    MUSCULOSKELETAL EXAMINATION:  Extremities:  Right upper extremity  Right clavicle  Well-healed incision, no erythema  Mild tenderness over incision  ROM as expected  5/5 resisted abduction, ER  Sensation intact to upper extremity nerve distributions  Extremity is warm and well perfused    _____________________________________________________  STUDIES REVIEWED:  I personally reviewed the images and interpretation is as follows:  X-rays of the right clavicle performed on 01/12/2021 demonstrate a healed clavicle fracture in proper anatomic alignment  Hardware intact, no evidence of erosion of the acromion  PROCEDURES PERFORMED:  None performed      Scribe Attestation    I,:  Sandy Leiva am acting as a scribe while in the presence of the attending physician :       I,:  Munira Rojas MD personally performed the services described in this documentation    as scribed in my presence :

## 2021-01-14 ENCOUNTER — TELEPHONE (OUTPATIENT)
Dept: OBGYN CLINIC | Facility: HOSPITAL | Age: 26
End: 2021-01-14

## 2021-01-14 NOTE — TELEPHONE ENCOUNTER
Patient sees Dr Nan Calabrese    Patient is requesting a work note to be mailed to his address on file  Patient stated he is to return to work 1/30  His employers were requesting a work note from patient's visit with Dr Nan Calabrese

## 2021-01-15 NOTE — TELEPHONE ENCOUNTER
Called patient and left voicemail for patient to call us back to let us know if he wants letter mailed to him or faxed to employer

## 2021-02-08 DIAGNOSIS — S42.031A CLOSED DISPLACED FRACTURE OF ACROMIAL END OF RIGHT CLAVICLE, INITIAL ENCOUNTER: Primary | ICD-10-CM

## 2021-02-12 ENCOUNTER — OFFICE VISIT (OUTPATIENT)
Dept: OBGYN CLINIC | Facility: CLINIC | Age: 26
End: 2021-02-12
Payer: COMMERCIAL

## 2021-02-12 ENCOUNTER — APPOINTMENT (OUTPATIENT)
Dept: RADIOLOGY | Facility: CLINIC | Age: 26
End: 2021-02-12
Payer: COMMERCIAL

## 2021-02-12 VITALS
HEIGHT: 76 IN | SYSTOLIC BLOOD PRESSURE: 138 MMHG | WEIGHT: 274 LBS | BODY MASS INDEX: 33.36 KG/M2 | HEART RATE: 74 BPM | DIASTOLIC BLOOD PRESSURE: 84 MMHG

## 2021-02-12 DIAGNOSIS — S42.031A CLOSED DISPLACED FRACTURE OF ACROMIAL END OF RIGHT CLAVICLE, INITIAL ENCOUNTER: ICD-10-CM

## 2021-02-12 DIAGNOSIS — Z98.890 S/P ORIF (OPEN REDUCTION INTERNAL FIXATION) FRACTURE: Primary | ICD-10-CM

## 2021-02-12 DIAGNOSIS — Z87.81 S/P ORIF (OPEN REDUCTION INTERNAL FIXATION) FRACTURE: Primary | ICD-10-CM

## 2021-02-12 PROCEDURE — 73000 X-RAY EXAM OF COLLAR BONE: CPT

## 2021-02-12 PROCEDURE — 99213 OFFICE O/P EST LOW 20 MIN: CPT | Performed by: ORTHOPAEDIC SURGERY

## 2021-02-12 NOTE — PROGRESS NOTES
Orthopaedics Office Visit - Post-op Patient Visit    ASSESSMENT/PLAN:    Assessment:   4 months s/p ORIF right clavicle performed on 10/19/2020      Plan:   - Discussed conservative vs surgical intervention with patient at length  - Continue with ROM exercises of the shoulder  - Ice / ibuprofen as needed for pain  - Patient will follow up in 2 months for discussion of removal of clavicle hardware (will be 6 months out from surgery at that time)      To Do Next Visit:  Discuss surgery     _____________________________________________________  CHIEF COMPLAINT:  Chief Complaint   Patient presents with    Right Shoulder - Follow-up       SUBJECTIVE:  Dami Blood is a 30 y o  male who presents 4 months s/p ORIF right clavicle performed on 10/19/2020  Patient states that his shoulder is doing well  Patient states he continues to have mild pain over the lateral aspect of his incision site  Patient states that he has minimal pain with ROM of the shoulder  Patient denies any numbness or tingling in the arm  Patient offers no other complaints at this time  SOCIAL HISTORY:  Social History     Tobacco Use    Smoking status: Never Smoker    Smokeless tobacco: Never Used   Substance Use Topics    Alcohol use: Yes     Frequency: Monthly or less     Comment: OCCAS    Drug use: Never       MEDICATIONS:  No current outpatient medications on file  REVIEW OF SYSTEMS:  MSK: Right shoulder pain  Neuro: N/A   Pertinent items are otherwise noted in HPI  A comprehensive review of systems was otherwise negative     _____________________________________________________  PHYSICAL EXAMINATION:  Vital signs: /84   Pulse 74   Ht 6' 4" (1 93 m)   Wt 124 kg (274 lb)   BMI 33 35 kg/m²   General: No acute distress, awake and alert  Psychiatric: Mood and affect appear appropriate  HEENT: Trachea Midline, No torticollis, no apparent facial trauma  Cardiovascular: No audible murmurs;  Extremities appear perfused  Pulmonary: No audible wheezing or stridor  Skin: No open lesions; see further details (if any) below      MUSCULOSKELETAL EXAMINATION:  Right shoulder examination:  - incision site clean dry and intact with no surrounding erythema  Right extremity appears well-perfused overall  - mild tenderness palpation noted lateral clavicle  No other bony or soft tissue tenderness palpation appreciated at this time  - full range of motion of shoulder noted with no pain  - 5/5 strength noted in all planes of motion with no pain  - NV intact    _____________________________________________________  STUDIES REVIEWED:  I personally reviewed the images and interpretation is as follows:  Right clavicle XR 2 VIEWS:  Healed clavicle fracture with intact hardware present       PROCEDURES PERFORMED:  No procedures were performed at this time         Ramila Koroma PA-C - assisting    Bettina Pacheco MD

## 2021-02-12 NOTE — PATIENT INSTRUCTIONS
- Discussed conservative vs surgical intervention with patient at length  - Continue with ROM exercises of the shoulder  - Ice / ibuprofen as needed for pain  - Patient will follow up in 2 months for discussion of removal of clavicle hardware

## 2021-03-01 ENCOUNTER — TELEPHONE (OUTPATIENT)
Dept: OBGYN CLINIC | Facility: HOSPITAL | Age: 26
End: 2021-03-01

## 2021-03-01 NOTE — TELEPHONE ENCOUNTER
Sona San is calling to request that copies of his disability and return to work forms be mailed to his home address       Odilon Perales  [address] [place] [address]

## 2021-04-11 DIAGNOSIS — Z98.890 S/P ORIF (OPEN REDUCTION INTERNAL FIXATION) FRACTURE: Primary | ICD-10-CM

## 2021-04-11 DIAGNOSIS — Z87.81 S/P ORIF (OPEN REDUCTION INTERNAL FIXATION) FRACTURE: Primary | ICD-10-CM

## 2021-04-16 ENCOUNTER — APPOINTMENT (OUTPATIENT)
Dept: RADIOLOGY | Facility: CLINIC | Age: 26
End: 2021-04-16
Payer: COMMERCIAL

## 2021-04-16 ENCOUNTER — OFFICE VISIT (OUTPATIENT)
Dept: OBGYN CLINIC | Facility: CLINIC | Age: 26
End: 2021-04-16
Payer: COMMERCIAL

## 2021-04-16 VITALS
BODY MASS INDEX: 33.36 KG/M2 | HEART RATE: 69 BPM | WEIGHT: 274 LBS | DIASTOLIC BLOOD PRESSURE: 83 MMHG | SYSTOLIC BLOOD PRESSURE: 150 MMHG | HEIGHT: 76 IN

## 2021-04-16 DIAGNOSIS — Z87.81 S/P ORIF (OPEN REDUCTION INTERNAL FIXATION) FRACTURE: ICD-10-CM

## 2021-04-16 DIAGNOSIS — Z96.9 PRESENCE OF RETAINED HARDWARE: Primary | ICD-10-CM

## 2021-04-16 DIAGNOSIS — Z98.890 S/P ORIF (OPEN REDUCTION INTERNAL FIXATION) FRACTURE: ICD-10-CM

## 2021-04-16 PROCEDURE — 3008F BODY MASS INDEX DOCD: CPT | Performed by: ORTHOPAEDIC SURGERY

## 2021-04-16 PROCEDURE — 73000 X-RAY EXAM OF COLLAR BONE: CPT

## 2021-04-16 PROCEDURE — 99213 OFFICE O/P EST LOW 20 MIN: CPT | Performed by: ORTHOPAEDIC SURGERY

## 2021-04-16 RX ORDER — CHLORHEXIDINE GLUCONATE 0.12 MG/ML
15 RINSE ORAL ONCE
Status: CANCELLED | OUTPATIENT
Start: 2021-04-16 | End: 2021-04-16

## 2021-04-16 NOTE — PROGRESS NOTES
Orthopaedics Office Visit - Post-op Patient Visit    ASSESSMENT/PLAN:    Assessment:   6 months s/p ORIF right clavicle performed on 10/19/2020      Plan:   - Discussed conservative and surgical intervention with patient at length  Patient opted for surgical intervention at this time  - risks and benefits were discussed with patient at length  Procedure being performed: REMOVAL OF RETAINED CLAVICLE HARDWARE  informed consent was obtained at this time  Extensive discussion was had with patient concerning clavicle hook plates  I advised the patient that there have been patients who retained their clavicle hook plate for greater than 2 years to eventually become asymptomatic at  shoulder  However I advised the patient that retaining the clavicle hook plate bears the risk of the hook portion of the plate breaking and migrating in the shoulder, acromial attrition and or fracture over time, shoulder impingement or rotator cuff issues in the future  I also advised the patient that plate removal may result in infection, nerve and blood vessel damage, impaired limb function, we fracture of clavicle, displacement of lateral aspect of clavicle, however patient acknowledged all these risks and decided to proceed with clavicle hook plate removal     - Motrin / tylenol as needed / directed   - Ice / heat as directed   - Follow up post op       To Do Next Visit:  Sutures out, XR right clavicle     _____________________________________________________  CHIEF COMPLAINT:  Chief Complaint   Patient presents with    Right Shoulder - Follow-up         SUBJECTIVE:  Mortimer Scurry is a 30 y o  male who presents 6 months s/p ORIF right clavicle performed on 10/19/2020  Patient states that his shoulder is doing well overall  Patient states that he has regained full range of motion of the shoulder with no pain  Patient states he has minimal pain over the incision site itself    Patient states that he has not been icing or heating the shoulder  Patient denies any numbness tingling the arm  Patient offers no other complaints at this time  SOCIAL HISTORY:  Social History     Tobacco Use    Smoking status: Never Smoker    Smokeless tobacco: Never Used   Substance Use Topics    Alcohol use: Yes     Frequency: Monthly or less     Comment: OCCAS    Drug use: Never       MEDICATIONS:  No current outpatient medications on file  REVIEW OF SYSTEMS:  MSK: Right shoulder pain  Neuro: WNL   Pertinent items are otherwise noted in HPI  A comprehensive review of systems was otherwise negative     _____________________________________________________  PHYSICAL EXAMINATION:  Vital signs: /83   Pulse 69   Ht 6' 4" (1 93 m)   Wt 124 kg (274 lb)   BMI 33 35 kg/m²   General: No acute distress, awake and alert  Psychiatric: Mood and affect appear appropriate  HEENT: Trachea Midline, No torticollis, no apparent facial trauma  Cardiovascular: No audible murmurs; Extremities appear perfused  Pulmonary: No audible wheezing or stridor  Skin: No open lesions; see further details (if any) below      MUSCULOSKELETAL EXAMINATION:  Right shoulder examination:  - Patient sitting comfortably in the office in no acute distress   - incision site clean dry intact in the surrounding ortho ecchymosis  Keloid formation of scar present   - No tenderness to palpation noted at the time  - full range of motion of shoulder noted with no pain  - NV intact      _____________________________________________________  STUDIES REVIEWED:  I personally reviewed the images and interpretation is as follows:  Right clavicle XR 2 views:  Healed clavicle fracture with intact hardware present  PROCEDURES PERFORMED:  No procedures were performed at this time         Fadi Goodwin PA-C - assisting    Fabienne Quinteros MD

## 2021-04-16 NOTE — H&P (VIEW-ONLY)
Orthopaedics Office Visit - Post-op Patient Visit    ASSESSMENT/PLAN:    Assessment:   6 months s/p ORIF right clavicle performed on 10/19/2020      Plan:   - Discussed conservative and surgical intervention with patient at length  Patient opted for surgical intervention at this time  - risks and benefits were discussed with patient at length  Procedure being performed: REMOVAL OF RETAINED CLAVICLE HARDWARE  informed consent was obtained at this time  Extensive discussion was had with patient concerning clavicle hook plates  I advised the patient that there have been patients who retained their clavicle hook plate for greater than 2 years to eventually become asymptomatic at  shoulder  However I advised the patient that retaining the clavicle hook plate bears the risk of the hook portion of the plate breaking and migrating in the shoulder, acromial attrition and or fracture over time, shoulder impingement or rotator cuff issues in the future  I also advised the patient that plate removal may result in infection, nerve and blood vessel damage, impaired limb function, we fracture of clavicle, displacement of lateral aspect of clavicle, however patient acknowledged all these risks and decided to proceed with clavicle hook plate removal     - Motrin / tylenol as needed / directed   - Ice / heat as directed   - Follow up post op       To Do Next Visit:  Sutures out, XR right clavicle     _____________________________________________________  CHIEF COMPLAINT:  Chief Complaint   Patient presents with    Right Shoulder - Follow-up         SUBJECTIVE:  Marleen Diane is a 30 y o  male who presents 6 months s/p ORIF right clavicle performed on 10/19/2020  Patient states that his shoulder is doing well overall  Patient states that he has regained full range of motion of the shoulder with no pain  Patient states he has minimal pain over the incision site itself    Patient states that he has not been icing or heating the shoulder  Patient denies any numbness tingling the arm  Patient offers no other complaints at this time  SOCIAL HISTORY:  Social History     Tobacco Use    Smoking status: Never Smoker    Smokeless tobacco: Never Used   Substance Use Topics    Alcohol use: Yes     Frequency: Monthly or less     Comment: OCCAS    Drug use: Never       MEDICATIONS:  No current outpatient medications on file  REVIEW OF SYSTEMS:  MSK: Right shoulder pain  Neuro: WNL   Pertinent items are otherwise noted in HPI  A comprehensive review of systems was otherwise negative     _____________________________________________________  PHYSICAL EXAMINATION:  Vital signs: /83   Pulse 69   Ht 6' 4" (1 93 m)   Wt 124 kg (274 lb)   BMI 33 35 kg/m²   General: No acute distress, awake and alert  Psychiatric: Mood and affect appear appropriate  HEENT: Trachea Midline, No torticollis, no apparent facial trauma  Cardiovascular: No audible murmurs; Extremities appear perfused  Pulmonary: No audible wheezing or stridor  Skin: No open lesions; see further details (if any) below      MUSCULOSKELETAL EXAMINATION:  Right shoulder examination:  - Patient sitting comfortably in the office in no acute distress   - incision site clean dry intact in the surrounding ortho ecchymosis  Keloid formation of scar present   - No tenderness to palpation noted at the time  - full range of motion of shoulder noted with no pain  - NV intact      _____________________________________________________  STUDIES REVIEWED:  I personally reviewed the images and interpretation is as follows:  Right clavicle XR 2 views:  Healed clavicle fracture with intact hardware present  PROCEDURES PERFORMED:  No procedures were performed at this time         Daniella García PA-C - assisting    Jakob Soto MD

## 2021-04-29 NOTE — PRE-PROCEDURE INSTRUCTIONS
No outpatient medications have been marked as taking for [facility]  Pt is not currently taking any medications    My Surgical Experience    The following information was developed to assist you to prepare for your operation  What do I need to do before coming to the hospital?   Arrange for a responsible person to drive you to and from the hospital    Arrange care for your children at home  Children are not allowed in the recovery areas of the hospital   Plan to wear clothing that is easy to put on and take off  If you are having shoulder surgery, wear a shirt that buttons or zippers in the front  Bathing  o Shower the evening before and the morning of your surgery with an antibacterial soap  Please refer to the Pre Op Showering Instructions for Surgery Patients Sheet   o Remove nail polish and all body piercing jewelry  o Do not shave any body part for at least 24 hours before surgery-this includes face, arms, legs and upper body  Food  o Nothing to eat or drink after midnight the night before your surgery  This includes candy and chewing gum  o Exception: If your surgery is after 12:00pm (noon), you may have clear liquids such as 7-Up®, ginger ale, apple or cranberry juice, Jell-O®, water, or clear broth until 8:00 am  o Do not drink milk or juice with pulp on the morning before surgery  o Do not drink alcohol 24 hours before surgery  Medicine  o Follow instructions you received from your surgeon about which medicines you may take on the day of surgery  o If instructed to take medicine on the morning of surgery, take pills with just a small sip of water  Call your prescribing doctor for specific infroamtion on what to do if you take insulin    What should I bring to the hospital?    Bring:  Marianne Randall or a walker, if you have them, for foot or knee surgery   A list of the daily medicines, vitamins, minerals, herbals and nutritional supplements you take   Include the dosages of medicines and the time you take them each day   Glasses, dentures or hearing aids   Minimal clothing; you will be wearing hospital sleepwear   Photo ID; required to verify your identity   If you have a Living Will or Power of , bring a copy of the documents   If you have an ostomy, bring an extra pouch and any supplies you use    Do not bring   Medicines or inhalers   Money, valuables or jewelry    What other information should I know about the day of surgery?  Notify your surgeons if you develop a cold, sore throat, cough, fever, rash or any other illness   Report to the Ambulatory Surgical/Same Day Surgery Unit   You will be instructed to stop at Registration only if you have not been pre-registered   Inform your  fi they do not stay that they will be asked by the staff to leave a phone number where they can be reached   Be available to be reached before surgery  In the event the operating room schedule changes, you may be asked to come in earlier or later than expected    *It is important to tell your doctor and others involved in your health care if you are taking or have been taking any non-prescription drugs, vitamins, minerals, herbals or other nutritional supplements   Any of these may interact with some food or medicines and cause a reaction

## 2021-04-30 ENCOUNTER — ANESTHESIA EVENT (OUTPATIENT)
Dept: PERIOP | Facility: HOSPITAL | Age: 26
End: 2021-04-30
Payer: COMMERCIAL

## 2021-05-01 ENCOUNTER — IMMUNIZATIONS (OUTPATIENT)
Dept: FAMILY MEDICINE CLINIC | Facility: HOSPITAL | Age: 26
End: 2021-05-01

## 2021-05-01 DIAGNOSIS — Z23 ENCOUNTER FOR IMMUNIZATION: Primary | ICD-10-CM

## 2021-05-01 PROCEDURE — 91300 SARS-COV-2 / COVID-19 MRNA VACCINE (PFIZER-BIONTECH) 30 MCG: CPT

## 2021-05-01 PROCEDURE — 0001A SARS-COV-2 / COVID-19 MRNA VACCINE (PFIZER-BIONTECH) 30 MCG: CPT
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
## 2021-05-03 ENCOUNTER — APPOINTMENT (OUTPATIENT)
Dept: RADIOLOGY | Facility: HOSPITAL | Age: 26
End: 2021-05-03
Payer: COMMERCIAL

## 2021-05-03 ENCOUNTER — HOSPITAL ENCOUNTER (OUTPATIENT)
Facility: HOSPITAL | Age: 26
Setting detail: OUTPATIENT SURGERY
Discharge: HOME | End: 2021-05-03
Attending: ORTHOPAEDIC SURGERY | Admitting: ORTHOPAEDIC SURGERY
Payer: COMMERCIAL

## 2021-05-03 ENCOUNTER — ANESTHESIA (OUTPATIENT)
Dept: PERIOP | Facility: HOSPITAL | Age: 26
End: 2021-05-03
Payer: COMMERCIAL

## 2021-05-03 VITALS
HEIGHT: 76 IN | WEIGHT: 271.17 LBS | RESPIRATION RATE: 10 BRPM | OXYGEN SATURATION: 95 % | DIASTOLIC BLOOD PRESSURE: 70 MMHG | TEMPERATURE: 98.1 F | SYSTOLIC BLOOD PRESSURE: 132 MMHG | HEART RATE: 71 BPM | BODY MASS INDEX: 33.02 KG/M2

## 2021-05-03 DIAGNOSIS — Z96.9 PRESENCE OF RETAINED HARDWARE: Primary | ICD-10-CM

## 2021-05-03 PROCEDURE — 73000 X-RAY EXAM OF COLLAR BONE: CPT

## 2021-05-03 PROCEDURE — 20680 REMOVAL OF IMPLANT DEEP: CPT | Performed by: PHYSICIAN ASSISTANT

## 2021-05-03 PROCEDURE — 20680 REMOVAL OF IMPLANT DEEP: CPT | Performed by: ORTHOPAEDIC SURGERY

## 2021-05-03 RX ORDER — PROPOFOL 10 MG/ML
INJECTION, EMULSION INTRAVENOUS AS NEEDED
Status: DISCONTINUED | OUTPATIENT
Start: 2021-05-03 | End: 2021-05-03

## 2021-05-03 RX ORDER — NEOSTIGMINE METHYLSULFATE 1 MG/ML
INJECTION INTRAVENOUS AS NEEDED
Status: DISCONTINUED | OUTPATIENT
Start: 2021-05-03 | End: 2021-05-03

## 2021-05-03 RX ORDER — FENTANYL CITRATE/PF 50 MCG/ML
50 SYRINGE (ML) INJECTION
Status: DISCONTINUED | OUTPATIENT
Start: 2021-05-03 | End: 2021-05-03 | Stop reason: HOSPADM

## 2021-05-03 RX ORDER — LIDOCAINE HYDROCHLORIDE 10 MG/ML
INJECTION, SOLUTION EPIDURAL; INFILTRATION; INTRACAUDAL; PERINEURAL AS NEEDED
Status: DISCONTINUED | OUTPATIENT
Start: 2021-05-03 | End: 2021-05-03

## 2021-05-03 RX ORDER — OXYCODONE HYDROCHLORIDE 5 MG/1
5 TABLET ORAL EVERY 4 HOURS PRN
Status: DISCONTINUED | OUTPATIENT
Start: 2021-05-03 | End: 2021-05-03 | Stop reason: HOSPADM

## 2021-05-03 RX ORDER — ROCURONIUM BROMIDE 10 MG/ML
INJECTION, SOLUTION INTRAVENOUS AS NEEDED
Status: DISCONTINUED | OUTPATIENT
Start: 2021-05-03 | End: 2021-05-03

## 2021-05-03 RX ORDER — SODIUM CHLORIDE, SODIUM LACTATE, POTASSIUM CHLORIDE, CALCIUM CHLORIDE 600; 310; 30; 20 MG/100ML; MG/100ML; MG/100ML; MG/100ML
125 INJECTION, SOLUTION INTRAVENOUS CONTINUOUS
Status: DISCONTINUED | OUTPATIENT
Start: 2021-05-03 | End: 2021-05-03 | Stop reason: HOSPADM

## 2021-05-03 RX ORDER — MULTIVITAMIN
1 TABLET ORAL DAILY
COMMUNITY

## 2021-05-03 RX ORDER — HYDROMORPHONE HCL/PF 1 MG/ML
0.5 SYRINGE (ML) INJECTION
Status: DISCONTINUED | OUTPATIENT
Start: 2021-05-03 | End: 2021-05-03 | Stop reason: HOSPADM

## 2021-05-03 RX ORDER — GLYCOPYRROLATE 0.2 MG/ML
INJECTION INTRAMUSCULAR; INTRAVENOUS AS NEEDED
Status: DISCONTINUED | OUTPATIENT
Start: 2021-05-03 | End: 2021-05-03

## 2021-05-03 RX ORDER — MIDAZOLAM HYDROCHLORIDE 2 MG/2ML
INJECTION, SOLUTION INTRAMUSCULAR; INTRAVENOUS AS NEEDED
Status: DISCONTINUED | OUTPATIENT
Start: 2021-05-03 | End: 2021-05-03

## 2021-05-03 RX ORDER — LIDOCAINE HYDROCHLORIDE 10 MG/ML
0.5 INJECTION, SOLUTION EPIDURAL; INFILTRATION; INTRACAUDAL; PERINEURAL ONCE AS NEEDED
Status: DISCONTINUED | OUTPATIENT
Start: 2021-05-03 | End: 2021-05-03 | Stop reason: HOSPADM

## 2021-05-03 RX ORDER — CHLORHEXIDINE GLUCONATE 0.12 MG/ML
15 RINSE ORAL ONCE
Status: COMPLETED | OUTPATIENT
Start: 2021-05-03 | End: 2021-05-03

## 2021-05-03 RX ORDER — FENTANYL CITRATE 50 UG/ML
INJECTION, SOLUTION INTRAMUSCULAR; INTRAVENOUS AS NEEDED
Status: DISCONTINUED | OUTPATIENT
Start: 2021-05-03 | End: 2021-05-03

## 2021-05-03 RX ORDER — BUPIVACAINE HYDROCHLORIDE 2.5 MG/ML
INJECTION, SOLUTION EPIDURAL; INFILTRATION; INTRACAUDAL AS NEEDED
Status: DISCONTINUED | OUTPATIENT
Start: 2021-05-03 | End: 2021-05-03 | Stop reason: HOSPADM

## 2021-05-03 RX ORDER — ONDANSETRON 2 MG/ML
INJECTION INTRAMUSCULAR; INTRAVENOUS AS NEEDED
Status: DISCONTINUED | OUTPATIENT
Start: 2021-05-03 | End: 2021-05-03

## 2021-05-03 RX ORDER — OXYCODONE HYDROCHLORIDE 5 MG/1
5 TABLET ORAL EVERY 4 HOURS PRN
Qty: 15 TABLET | Refills: 0 | Status: SHIPPED | OUTPATIENT
Start: 2021-05-03 | End: 2021-05-13

## 2021-05-03 RX ORDER — MAGNESIUM HYDROXIDE 1200 MG/15ML
LIQUID ORAL AS NEEDED
Status: DISCONTINUED | OUTPATIENT
Start: 2021-05-03 | End: 2021-05-03 | Stop reason: HOSPADM

## 2021-05-03 RX ORDER — DEXAMETHASONE SODIUM PHOSPHATE 4 MG/ML
INJECTION, SOLUTION INTRA-ARTICULAR; INTRALESIONAL; INTRAMUSCULAR; INTRAVENOUS; SOFT TISSUE AS NEEDED
Status: DISCONTINUED | OUTPATIENT
Start: 2021-05-03 | End: 2021-05-03

## 2021-05-03 RX ORDER — MEPERIDINE HYDROCHLORIDE 50 MG/ML
12.5 INJECTION INTRAMUSCULAR; INTRAVENOUS; SUBCUTANEOUS
Status: DISCONTINUED | OUTPATIENT
Start: 2021-05-03 | End: 2021-05-03 | Stop reason: HOSPADM

## 2021-05-03 RX ORDER — CEFAZOLIN SODIUM 2 G/50ML
2000 SOLUTION INTRAVENOUS ONCE
Status: COMPLETED | OUTPATIENT
Start: 2021-05-03 | End: 2021-05-03

## 2021-05-03 RX ADMIN — FENTANYL CITRATE 100 MCG: 50 INJECTION, SOLUTION INTRAMUSCULAR; INTRAVENOUS at 09:30

## 2021-05-03 RX ADMIN — LIDOCAINE HYDROCHLORIDE 50 MG: 10 INJECTION, SOLUTION EPIDURAL; INFILTRATION; INTRACAUDAL; PERINEURAL at 09:30

## 2021-05-03 RX ADMIN — ROCURONIUM BROMIDE 50 MG: 10 INJECTION, SOLUTION INTRAVENOUS at 09:30

## 2021-05-03 RX ADMIN — FENTANYL CITRATE 50 MCG: 50 INJECTION, SOLUTION INTRAMUSCULAR; INTRAVENOUS at 10:36

## 2021-05-03 RX ADMIN — PROPOFOL 200 MG: 10 INJECTION, EMULSION INTRAVENOUS at 09:30

## 2021-05-03 RX ADMIN — MIDAZOLAM HYDROCHLORIDE 2 MG: 1 INJECTION, SOLUTION INTRAMUSCULAR; INTRAVENOUS at 09:26

## 2021-05-03 RX ADMIN — DEXAMETHASONE SODIUM PHOSPHATE 4 MG: 4 INJECTION, SOLUTION INTRAMUSCULAR; INTRAVENOUS at 10:09

## 2021-05-03 RX ADMIN — FENTANYL CITRATE 50 MCG: 50 INJECTION, SOLUTION INTRAMUSCULAR; INTRAVENOUS at 11:50

## 2021-05-03 RX ADMIN — ROCURONIUM BROMIDE 20 MG: 10 INJECTION, SOLUTION INTRAVENOUS at 10:03

## 2021-05-03 RX ADMIN — SODIUM CHLORIDE, SODIUM LACTATE, POTASSIUM CHLORIDE, AND CALCIUM CHLORIDE 125 ML/HR: .6; .31; .03; .02 INJECTION, SOLUTION INTRAVENOUS at 07:51

## 2021-05-03 RX ADMIN — GLYCOPYRROLATE 0.6 MG: 0.2 INJECTION, SOLUTION INTRAMUSCULAR; INTRAVENOUS at 10:49

## 2021-05-03 RX ADMIN — FENTANYL CITRATE 50 MCG: 50 INJECTION, SOLUTION INTRAMUSCULAR; INTRAVENOUS at 10:45

## 2021-05-03 RX ADMIN — CHLORHEXIDINE GLUCONATE 15 ML: 1.2 SOLUTION ORAL at 07:52

## 2021-05-03 RX ADMIN — ONDANSETRON 4 MG: 2 INJECTION INTRAMUSCULAR; INTRAVENOUS at 10:09

## 2021-05-03 RX ADMIN — FENTANYL CITRATE 50 MCG: 50 INJECTION, SOLUTION INTRAMUSCULAR; INTRAVENOUS at 11:40

## 2021-05-03 RX ADMIN — NEOSTIGMINE METHYLSULFATE 3 MG: 1 INJECTION INTRAVENOUS at 10:49

## 2021-05-03 RX ADMIN — CEFAZOLIN SODIUM 2000 MG: 2 SOLUTION INTRAVENOUS at 09:32

## 2021-05-03 RX ADMIN — HYDROMORPHONE HYDROCHLORIDE 0.5 MG: 1 INJECTION, SOLUTION INTRAMUSCULAR; INTRAVENOUS; SUBCUTANEOUS at 12:00

## 2021-05-03 RX ADMIN — FENTANYL CITRATE 50 MCG: 50 INJECTION, SOLUTION INTRAMUSCULAR; INTRAVENOUS at 10:03

## 2021-05-03 NOTE — NURSING NOTE
Patient noted to have decreased respiratory rate and apneic episodes while sleeping  O2 sat WNL and patient easily arousable  Patient able to take deep breaths when instructed  Patient offers no complaints  Anesthesia provider contacted to evaluate patient, will continue to monitor

## 2021-05-03 NOTE — INTERVAL H&P NOTE
H&P reviewed  After examining the patient I find no changes in the patients condition since the H&P had been written      Vitals:    05/03/21 0742   BP: 137/75   Pulse: 83   Resp: 21   Temp: 97 6 °F (36 4 °C)   SpO2: 97%

## 2021-05-03 NOTE — NURSING NOTE
Dr Mcmahon Overall notified of two 30 sec apneic episodes while patient sleeping as well as occasional oxygen desaturation to 88% on RA (Patient placed on 2L NC), patient easily arousable and follows instructions to take deep breaths  Dr Lisandro Verduzco at bedside to evaluate patient-will continue to monitor  Will trial off of supplemental oxygen per Dr Lisandro Verduzco and closely monitor

## 2021-05-03 NOTE — ANESTHESIA POSTPROCEDURE EVALUATION
Post-Op Assessment Note    CV Status:  Stable  Pain Score: 0    Pain management: adequate     Mental Status:  Sleepy   Hydration Status:  Stable   PONV Controlled:  Controlled   Airway Patency:  Patent and adequate   Two or more mitigation strategies used for obstructive sleep apnea   Post Op Vitals Reviewed: Yes      Staff: CRNA         No complications documented      BP      Temp      Pulse     Resp      SpO2

## 2021-05-03 NOTE — DISCHARGE INSTRUCTIONS
Discharge Instructions - Orthopedics  Kira Velez 30 y o  male MRN: [id number]  Unit/Bed#: [facility] OR MAIN    Weight Bearing Status:                                           Weight bearing as tolerated right upper extremity    DVT prophylaxis  None    Pain:  Continue analgesics as directed    Dressing Instructions: May remove dressing in 5 days to shower - allow soapy and clean water to trickle over wound with no soaking or vigorous scrubbing at any time    Appt Instructions: If you do not have your appointment, please call the clinic at [phone] to schedule follow-up appointment in 2 weeks after surgery    Contact the office sooner if you experience any increased numbness/tingling in the extremities

## 2021-05-03 NOTE — QUICK NOTE
Patient having multiple respiratory pauses in phase II  Apnea episodes are >10 sec (as long as 30s) and are associated with significant desaturations (>10%)  Patient now awake, but sleepy and drifts off during low point in the conversation  This resembles narcotic associated sleep apnea  He denies ELIESER in his history and only complains of snoring on the STOP-BANG questionnaire  Combined this gives him a score of 3/8 (Snoring =1, Gender = 1, Observed Apnea =1) which qualifies as moderate risk of sleep apnea  This cannot be qualified as sleep apnea due to the concomitate narcotic administration  He should ideally be followed up by his PCP for possible sleep study referral     Plan to discharge when no episodes of apnea for at least an hours, and no desaturations <85% on room air

## 2021-05-04 NOTE — OP NOTE
OPERATIVE REPORT  PATIENT NAME: Verena Manning    :  1995  MRN: [id number]  Pt Location: [facility] OR ROOM 03    SURGERY DATE: 5/3/2021    Surgeon(s) and Role:     * Davie You MD - Primary     * Flora Rose PA-C - Assisting    Preop Diagnosis:  Retained R shoulder clavicle hook plate    Post-Op Diagnosis Codes:  Same (hardware removed)    Procedure(s) (LRB):  REMOVAL HARDWARE RIGHT CLAVICLE HOOK PLATE (Right)    Procedure:  Removal of deep implant, R shoulder (CPT 16290)    Specimen(s):  * No specimens in log *    Estimated Blood Loss:   Minimal    Drains:  * No LDAs found *    Anesthesia Type:   General    Operative Indications:  Retained R clavicle hook plate    Operative Findings:  See below    Complications:   None    Procedure and Technique:  The patient's operative site, laterality, procedure, consent were verified in the preoperative area and the patient was transitioned to the operating room  General anesthesia was provided by the anesthesia team   After time-out for safety, old right clavicle incision was utilized and full-thickness flaps were elevated  Scar tissue was dissected down to the level of the   Clavicle plate which was easily identified  Screws and plate were removed in entirety  Removal of all hardware was verified by fluoroscopic imaging  Rongeur was used to remove small prominent bone  growth at scalloped areas  Wound was copiously irrigated with sterile saline  The right upper extremity was then taken through a stress examination under fluoroscopy  The arm was abducted and rotated and axially loaded with no noted displacement of clavicle shaft or lateral structures  Deep layer closure took place with 0 Vicryl suture in interrupted fashion  Subcutaneous layer closure took place with 2-0 Vicryl suture in interrupted fashion  Skin layer closure took place with 3-0 nylon suture in interrupted fashion  Sterile dressing consisted of Mepilex dressing    All final counts, including but not limited to instrument, sponge, needle counts were correct  I was present for the entire procedure  The patient was awakened and transitioned to the PACU in stable condition  There were no immediate complications  There was no qualified resident available for the procedure  Critical assistance from Ra Allen  Required for all components of the case including patient positioning, soft tissue retraction and passage of instrumentation  This was particularly important given the patient's BMI of 33 1    The patient may be weight bearing as tolerated right upper extremity  Suture removal will be in 2 weeks in clinic  Patient may shower after POD5        Patient Disposition:  PACU     SIGNATURE: Edilia Razo MD  DATE: May 4, 2021  TIME: 4:00 PM

## 2021-05-16 DIAGNOSIS — Z96.9 PRESENCE OF RETAINED HARDWARE: Primary | ICD-10-CM

## 2021-05-18 ENCOUNTER — OFFICE VISIT (OUTPATIENT)
Dept: OBGYN CLINIC | Facility: CLINIC | Age: 26
End: 2021-05-18

## 2021-05-18 ENCOUNTER — APPOINTMENT (OUTPATIENT)
Dept: RADIOLOGY | Facility: CLINIC | Age: 26
End: 2021-05-18
Payer: COMMERCIAL

## 2021-05-18 VITALS
SYSTOLIC BLOOD PRESSURE: 140 MMHG | HEIGHT: 76 IN | HEART RATE: 80 BPM | DIASTOLIC BLOOD PRESSURE: 84 MMHG | WEIGHT: 271 LBS | BODY MASS INDEX: 33 KG/M2

## 2021-05-18 DIAGNOSIS — Z96.9 PRESENCE OF RETAINED HARDWARE: ICD-10-CM

## 2021-05-18 DIAGNOSIS — Z98.890 S/P HARDWARE REMOVAL: Primary | ICD-10-CM

## 2021-05-18 PROCEDURE — 73000 X-RAY EXAM OF COLLAR BONE: CPT

## 2021-05-18 PROCEDURE — 99024 POSTOP FOLLOW-UP VISIT: CPT | Performed by: ORTHOPAEDIC SURGERY

## 2021-05-18 PROCEDURE — 3008F BODY MASS INDEX DOCD: CPT | Performed by: ORTHOPAEDIC SURGERY

## 2021-05-18 NOTE — PROGRESS NOTES
Orthopaedics Office Visit - Post-op Patient Visit    ASSESSMENT/PLAN:    Assessment:   Removal of hardware right clavicle hook plate 4/23/95  ORIF right clavicle performed on 10/19/2020    Plan:   WBAT RUE  Patient has full and painless RUE AROM  · Sutures removed today  · Activities as tolerated  · Patient will call when he knows his return to work date    _____________________________________________________  CHIEF COMPLAINT:  Chief Complaint   Patient presents with    Right Shoulder - Post-op         SUBJECTIVE:  Padma Edwards is a 30 y o  male who presents removal of hardware right clavicle 5/03/21  Patient states he is doing well and denies pain  He is able to complete ADLs without difficulty  No motor or sensory deficits  Pain well controlled  SOCIAL HISTORY:  Social History     Tobacco Use    Smoking status: Never Smoker    Smokeless tobacco: Never Used   Substance Use Topics    Alcohol use: Yes     Frequency: Monthly or less     Comment: OCCAS    Drug use: Never       MEDICATIONS:    Current Outpatient Medications:     Multiple Vitamin (multivitamin) tablet, Take 1 tablet by mouth daily, Disp: , Rfl:     REVIEW OF SYSTEMS:  MSK: see below  Neuro: normal  Pertinent items are otherwise noted in HPI  A comprehensive review of systems was otherwise negative     _____________________________________________________  PHYSICAL EXAMINATION:  Vital signs: /84   Pulse 80   Ht 6' 4" (1 93 m)   Wt 123 kg (271 lb)   BMI 32 99 kg/m²   General: No acute distress, awake and alert  Psychiatric: Mood and affect appear appropriate  HEENT: Trachea Midline, No torticollis, no apparent facial trauma  Cardiovascular: No audible murmurs;  Extremities appear perfused  Pulmonary: No audible wheezing or stridor  Skin: No open lesions; see further details (if any) below    MUSCULOSKELETAL EXAMINATION:  Right shoulder exam:  Right shoulder examination:  - Patient sitting comfortably in the office in no acute distress - incision site clean dry intact with no drainage (Sutures removed)   - No tenderness to palpation noted at the time  - full range of motion of shoulder noted with no pain  - Axillary, median, radial, ulnar nerve functions intact      _____________________________________________________  STUDIES REVIEWED:  I personally reviewed the images and interpretation is as follows: right clavicle x-rays demonstrates, evidence of recent hardware removal, healed fracture, no interval displacement of distal clavicle      PROCEDURES PERFORMED:  Procedures    Gerard Mcbride MD

## 2021-05-25 ENCOUNTER — TELEPHONE (OUTPATIENT)
Dept: OBGYN CLINIC | Facility: HOSPITAL | Age: 26
End: 2021-05-25

## 2021-05-26 ENCOUNTER — IMMUNIZATIONS (OUTPATIENT)
Dept: FAMILY MEDICINE CLINIC | Facility: HOSPITAL | Age: 26
End: 2021-05-26

## 2021-05-26 DIAGNOSIS — Z23 ENCOUNTER FOR IMMUNIZATION: Primary | ICD-10-CM

## 2021-05-26 PROCEDURE — 91300 SARS-COV-2 / COVID-19 MRNA VACCINE (PFIZER-BIONTECH) 30 MCG: CPT

## 2021-05-26 PROCEDURE — 0002A SARS-COV-2 / COVID-19 MRNA VACCINE (PFIZER-BIONTECH) 30 MCG: CPT

## 2021-06-08 ENCOUNTER — TELEPHONE (OUTPATIENT)
Dept: OBGYN CLINIC | Facility: CLINIC | Age: 26
End: 2021-06-08

## 2021-06-08 NOTE — TELEPHONE ENCOUNTER
Dr Slim Francisco patient    Thank you for completing [employer] disability paperwork  He is requesting a copy of the completed form he dropped off and would like you to call him once at  and ready to be picked up    Best contact #[phone]  Thank you

## 2021-06-23 NOTE — TELEPHONE ENCOUNTER
Received Return to work Form from Patient with the date to return 6/6/21  Checking with Provider on restrictions   Thank you
rolling walker (5 inch wheels)

## 2021-09-13 ENCOUNTER — TELEPHONE (OUTPATIENT)
Dept: OBGYN CLINIC | Facility: HOSPITAL | Age: 26
End: 2021-09-13

## 2021-09-13 NOTE — TELEPHONE ENCOUNTER
Patient is calling about his paperwork stating that he was suppose to return to work on 9/11/21 but the paperwork did not get back to his employer so now the return to work date will need to be changed for after the paper work is sent back to them  I let him know it was still in the que  Patient would like someone to call him before we send the paper work back to his work       [phone]

## 2021-09-14 ENCOUNTER — TELEPHONE (OUTPATIENT)
Dept: PODIATRY | Facility: CLINIC | Age: 26
End: 2021-09-14

## 2021-09-24 ENCOUNTER — OFFICE VISIT (OUTPATIENT)
Dept: OBGYN CLINIC | Facility: CLINIC | Age: 26
End: 2021-09-24

## 2021-09-24 VITALS
HEART RATE: 68 BPM | HEIGHT: 76 IN | SYSTOLIC BLOOD PRESSURE: 144 MMHG | RESPIRATION RATE: 18 BRPM | WEIGHT: 266.9 LBS | BODY MASS INDEX: 32.5 KG/M2 | DIASTOLIC BLOOD PRESSURE: 77 MMHG

## 2021-09-24 DIAGNOSIS — M51.26 BULGING LUMBAR DISC: Primary | ICD-10-CM

## 2021-09-24 DIAGNOSIS — S39.012A LUMBAR STRAIN, INITIAL ENCOUNTER: ICD-10-CM

## 2021-09-24 DIAGNOSIS — M54.16 RADICULOPATHY, LUMBAR REGION: ICD-10-CM

## 2021-09-24 PROCEDURE — 99214 OFFICE O/P EST MOD 30 MIN: CPT | Performed by: FAMILY MEDICINE

## 2021-09-24 RX ORDER — NAPROXEN 500 MG/1
500 TABLET ORAL 2 TIMES DAILY WITH MEALS
Qty: 60 TABLET | Refills: 0 | Status: SHIPPED | OUTPATIENT
Start: 2021-09-24 | End: 2022-04-27

## 2021-09-24 NOTE — PROGRESS NOTES
Assessment/Plan:  Assessment/Plan   Diagnoses and all orders for this visit:    Bulging lumbar disc  -     Ambulatory referral to Physical Therapy; Future    Radiculopathy, lumbar region  -     Ambulatory referral to Physical Therapy; Future    Lumbar strain, initial encounter  -     Ambulatory referral to Physical Therapy; Future  -     naproxen (NAPROSYN) 500 mg tablet; Take 1 tablet (500 mg total) by mouth 2 (two) times a day with meals        53-year-old active male with low back pain more than 5 years duration  Discussed with patient physical exam, imaging studies, impression and plan  CT scan abdomen pelvis from February 2020 is noted for bulging discs at L4-5 and L5-S1  Physical exam is unremarkable for midline or paraspinal tenderness  He has intact range of motion of lumbar spine  He has normal strength, sensation, and patellar reflex both lower extremities  There is no groin pain with WALT and FADDIR maneuvers of the hips  Clinical impression is that he is symptomatic from lumbar spine pathology which causes symptoms to radiate to lower extremities  I discussed treatment regimen of anti-inflammatory, supplements, topical anesthetic, and physical therapy  He is to take naproxen 500 mg twice daily with food consistently for 2 weeks and after that time take as needed  He is to start taking tumeric 500 mg twice daily, tart cherry at least 1000 mg daily, and glucosamine-chondroitin 2 to 3 times a day  He may apply topical diclofenac gel and topical CBD as needed  He is to start physical therapy as soon as possible and do home exercises as directed  He will follow up if no improvement after 5-6 weeks of formal therapy  Subjective:   Patient ID: Sammy Rodriguez is a 30 y o  male  Chief Complaint   Patient presents with    Spine - Pain       53-year-old active male presents for evaluation of low back pain of more than 5 years duration    He reports 1st having onset of pain while he is performing a dead lift  He had pain described as sudden in onset, localized to the lumbosacral aspect spine but worse on the right, throbbing and sharp, radiating to the right buttock and distally along the posterior lateral aspect of thigh to the lateral aspect of the lower leg, worse with bending and twisting, and improved with resting  He managed symptoms with rest and activity, heat, and stretching  He states that symptoms gradually improved, but over past few years he would experience flares of pain usually brought on by strenuous activity such as sudden twist or repetitive heavy lifting  He would experience pain in the low back and right lower leg that was last for few weeks at a time  He does report that in certain positions he would experience numbness in the right lower extremity  He has been using a massage gun to help with symptoms  His most recent flare was few weeks ago, but states that with resting from working out he has been without pain for more than few days  He denies any bowel or bladder incontinence  Back Pain  This is a chronic problem  The current episode started more than 1 year ago  The problem occurs intermittently  The problem has been waxing and waning  Associated symptoms include numbness  Pertinent negatives include no abdominal pain, arthralgias, chest pain, chills, fever, joint swelling, rash, sore throat or weakness  The symptoms are aggravated by twisting and bending  He has tried rest, NSAIDs, ice, heat and position changes for the symptoms  The treatment provided moderate relief  The following portions of the patient's history were reviewed and updated as appropriate: He  has a past medical history of Asthma and Hypertension  He  has a past surgical history that includes Myringotomy w/ tubes; pr open tx carpal scaphoid navicular fracture (Right, 10/14/2020);  Wrist surgery; pr open treatment clavicular fracture internal fx (Right, 10/19/2020); and pr removal deep implant (Right, 5/3/2021)  His family history includes Diabetes in his maternal aunt and maternal grandmother; Hypertension in his maternal aunt and maternal grandmother; No Known Problems in his father and mother  He  reports that he has never smoked  He has never used smokeless tobacco  He reports current alcohol use  He reports that he does not use drugs  He has No Known Allergies       Review of Systems   Constitutional: Negative for chills and fever  HENT: Negative for sore throat  Eyes: Negative for visual disturbance  Respiratory: Negative for shortness of breath  Cardiovascular: Negative for chest pain  Gastrointestinal: Negative for abdominal pain  Genitourinary: Negative for flank pain  Musculoskeletal: Positive for back pain  Negative for arthralgias and joint swelling  Skin: Negative for rash and wound  Neurological: Positive for numbness  Negative for weakness  Hematological: Does not bruise/bleed easily  Psychiatric/Behavioral: Negative for self-injury  Objective:  Vitals:    09/24/21 1510   BP: 144/77   Pulse: 68   Resp: 18   Weight: 121 kg (266 lb 14 4 oz)   Height: 6' 4" (1 93 m)     Right Ankle Exam     Muscle Strength   Dorsiflexion:  5/5  Plantar flexion:  5/5      Left Ankle Exam     Muscle Strength   Dorsiflexion:  5/5   Plantar flexion:  5/5       Right Hip Exam     Muscle Strength   Flexion: 5/5     Tests   WALT: negative    Comments:  Negative FADDIR      Left Hip Exam     Muscle Strength   Flexion: 5/5     Tests   WALT: negative    Comments:  Negative FADDIR      Back Exam     Tenderness   The patient is experiencing no tenderness  Range of Motion   The patient has normal back ROM      Muscle Strength   Right Quadriceps:  5/5   Left Quadriceps:  5/5     Tests   Straight leg raise right: negative  Straight leg raise left: negative    Reflexes   Patellar: normal    Other   Sensation: normal  Gait: normal           Strength/Myotome Testing     Left Ankle/Foot Dorsiflexion: 5  Plantar flexion: 5    Right Ankle/Foot   Dorsiflexion: 5  Plantar flexion: 5      Physical Exam  Vitals and nursing note reviewed  Constitutional:       General: He is not in acute distress  Appearance: He is well-developed  He is not ill-appearing or diaphoretic  HENT:      Head: Normocephalic and atraumatic  Right Ear: External ear normal       Left Ear: External ear normal    Eyes:      Conjunctiva/sclera: Conjunctivae normal    Neck:      Trachea: No tracheal deviation  Cardiovascular:      Rate and Rhythm: Normal rate  Pulmonary:      Effort: Pulmonary effort is normal  No respiratory distress  Abdominal:      General: There is no distension  Musculoskeletal:         General: No swelling, tenderness, deformity or signs of injury  Lumbar back: Negative right straight leg raise test and negative left straight leg raise test    Skin:     General: Skin is warm and dry  Coloration: Skin is not jaundiced or pale  Neurological:      Mental Status: He is alert and oriented to person, place, and time  Psychiatric:         Mood and Affect: Mood normal          Behavior: Behavior normal          Thought Content: Thought content normal          Judgment: Judgment normal          I have personally reviewed pertinent films in PACS and my interpretation is Bulging lumbar disc L4-L5 and L5-S1

## 2021-10-30 ENCOUNTER — OFFICE VISIT (OUTPATIENT)
Dept: URGENT CARE | Facility: CLINIC | Age: 26
End: 2021-10-30
Payer: COMMERCIAL

## 2021-10-30 VITALS
SYSTOLIC BLOOD PRESSURE: 140 MMHG | RESPIRATION RATE: 18 BRPM | HEART RATE: 71 BPM | TEMPERATURE: 98 F | OXYGEN SATURATION: 94 % | DIASTOLIC BLOOD PRESSURE: 64 MMHG | BODY MASS INDEX: 32.27 KG/M2 | WEIGHT: 265 LBS | HEIGHT: 76 IN

## 2021-10-30 DIAGNOSIS — B08.4 HAND, FOOT AND MOUTH DISEASE: Primary | ICD-10-CM

## 2021-10-30 PROCEDURE — 99213 OFFICE O/P EST LOW 20 MIN: CPT | Performed by: PHYSICIAN ASSISTANT

## 2021-11-08 ENCOUNTER — OFFICE VISIT (OUTPATIENT)
Dept: FAMILY MEDICINE CLINIC | Facility: CLINIC | Age: 26
End: 2021-11-08
Payer: COMMERCIAL

## 2021-11-08 VITALS
WEIGHT: 262 LBS | TEMPERATURE: 98.4 F | BODY MASS INDEX: 31.9 KG/M2 | HEART RATE: 72 BPM | OXYGEN SATURATION: 98 % | SYSTOLIC BLOOD PRESSURE: 138 MMHG | HEIGHT: 76 IN | DIASTOLIC BLOOD PRESSURE: 78 MMHG

## 2021-11-08 DIAGNOSIS — B08.4 HAND, FOOT AND MOUTH DISEASE (HFMD): ICD-10-CM

## 2021-11-08 DIAGNOSIS — Z13.1 SCREENING FOR DIABETES MELLITUS: ICD-10-CM

## 2021-11-08 DIAGNOSIS — Z00.00 HEALTH MAINTENANCE EXAMINATION: Primary | ICD-10-CM

## 2021-11-08 DIAGNOSIS — Z13.220 SCREENING FOR LIPID DISORDERS: ICD-10-CM

## 2021-11-08 PROCEDURE — 3725F SCREEN DEPRESSION PERFORMED: CPT | Performed by: PHYSICIAN ASSISTANT

## 2021-11-08 PROCEDURE — 1036F TOBACCO NON-USER: CPT | Performed by: PHYSICIAN ASSISTANT

## 2021-11-08 PROCEDURE — 3008F BODY MASS INDEX DOCD: CPT | Performed by: PHYSICIAN ASSISTANT

## 2021-11-08 PROCEDURE — 99385 PREV VISIT NEW AGE 18-39: CPT | Performed by: PHYSICIAN ASSISTANT

## 2021-11-29 ENCOUNTER — TELEPHONE (OUTPATIENT)
Dept: FAMILY MEDICINE CLINIC | Facility: CLINIC | Age: 26
End: 2021-11-29

## 2021-11-29 DIAGNOSIS — G44.89 OTHER HEADACHE SYNDROME: Primary | ICD-10-CM

## 2021-11-30 PROCEDURE — U0005 INFEC AGEN DETEC AMPLI PROBE: HCPCS | Performed by: PHYSICIAN ASSISTANT

## 2021-11-30 PROCEDURE — U0003 INFECTIOUS AGENT DETECTION BY NUCLEIC ACID (DNA OR RNA); SEVERE ACUTE RESPIRATORY SYNDROME CORONAVIRUS 2 (SARS-COV-2) (CORONAVIRUS DISEASE [COVID-19]), AMPLIFIED PROBE TECHNIQUE, MAKING USE OF HIGH THROUGHPUT TECHNOLOGIES AS DESCRIBED BY CMS-2020-01-R: HCPCS | Performed by: PHYSICIAN ASSISTANT

## 2021-12-01 LAB — SARS-COV-2 RNA RESP QL NAA+PROBE: NEGATIVE

## 2021-12-28 ENCOUNTER — NURSE TRIAGE (OUTPATIENT)
Dept: OTHER | Facility: OTHER | Age: 26
End: 2021-12-28

## 2021-12-28 DIAGNOSIS — U07.1 COVID-19: ICD-10-CM

## 2021-12-28 DIAGNOSIS — Z20.822 SUSPECTED SEVERE ACUTE RESPIRATORY SYNDROME CORONAVIRUS 2 (SARS-COV-2) INFECTION: Primary | ICD-10-CM

## 2021-12-29 PROCEDURE — 87636 SARSCOV2 & INF A&B AMP PRB: CPT | Performed by: PHYSICIAN ASSISTANT

## 2022-01-04 LAB
FLUAV RNA RESP QL NAA+PROBE: NEGATIVE
FLUBV RNA RESP QL NAA+PROBE: NEGATIVE
SARS-COV-2 RNA RESP QL NAA+PROBE: NEGATIVE

## 2022-04-25 ENCOUNTER — TELEPHONE (OUTPATIENT)
Dept: FAMILY MEDICINE CLINIC | Facility: CLINIC | Age: 27
End: 2022-04-25

## 2022-04-25 NOTE — TELEPHONE ENCOUNTER
Pt has an appt Wed, re back pain and is not able to work today or tomorrow    He does have paperwork for Wilmar Mckeon to complete so is asking if Wilmar Mckeon would back date the paper work to include today & tomorrow ? ??

## 2022-04-27 ENCOUNTER — OFFICE VISIT (OUTPATIENT)
Dept: FAMILY MEDICINE CLINIC | Facility: CLINIC | Age: 27
End: 2022-04-27
Payer: COMMERCIAL

## 2022-04-27 VITALS
DIASTOLIC BLOOD PRESSURE: 78 MMHG | WEIGHT: 270 LBS | SYSTOLIC BLOOD PRESSURE: 126 MMHG | BODY MASS INDEX: 32.88 KG/M2 | TEMPERATURE: 98.4 F | HEIGHT: 76 IN | OXYGEN SATURATION: 97 % | HEART RATE: 64 BPM

## 2022-04-27 DIAGNOSIS — M54.41 ACUTE RIGHT-SIDED LOW BACK PAIN WITH RIGHT-SIDED SCIATICA: Primary | ICD-10-CM

## 2022-04-27 PROCEDURE — 1036F TOBACCO NON-USER: CPT | Performed by: PHYSICIAN ASSISTANT

## 2022-04-27 PROCEDURE — 99214 OFFICE O/P EST MOD 30 MIN: CPT | Performed by: PHYSICIAN ASSISTANT

## 2022-04-27 RX ORDER — METHOCARBAMOL 750 MG/1
750 TABLET, FILM COATED ORAL EVERY 6 HOURS PRN
Qty: 20 TABLET | Refills: 0 | Status: SHIPPED | OUTPATIENT
Start: 2022-04-27

## 2022-04-27 RX ORDER — NAPROXEN 500 MG/1
500 TABLET ORAL 2 TIMES DAILY WITH MEALS
Qty: 30 TABLET | Refills: 0 | Status: SHIPPED | OUTPATIENT
Start: 2022-04-27
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
## 2022-05-11 ENCOUNTER — EVALUATION (OUTPATIENT)
Dept: PHYSICAL THERAPY | Facility: CLINIC | Age: 27
End: 2022-05-11
Payer: COMMERCIAL

## 2022-05-11 DIAGNOSIS — M54.41 ACUTE RIGHT-SIDED LOW BACK PAIN WITH RIGHT-SIDED SCIATICA: Primary | ICD-10-CM

## 2022-05-11 PROCEDURE — 97162 PT EVAL MOD COMPLEX 30 MIN: CPT | Performed by: PHYSICAL THERAPIST

## 2022-05-11 PROCEDURE — 97110 THERAPEUTIC EXERCISES: CPT | Performed by: PHYSICAL THERAPIST
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
## 2022-05-19 ENCOUNTER — OFFICE VISIT (OUTPATIENT)
Dept: PHYSICAL THERAPY | Facility: CLINIC | Age: 27
End: 2022-05-19
Payer: COMMERCIAL

## 2022-05-19 DIAGNOSIS — M54.41 ACUTE RIGHT-SIDED LOW BACK PAIN WITH RIGHT-SIDED SCIATICA: Primary | ICD-10-CM

## 2022-05-19 PROCEDURE — 97112 NEUROMUSCULAR REEDUCATION: CPT | Performed by: PHYSICAL THERAPIST

## 2022-05-19 PROCEDURE — 97140 MANUAL THERAPY 1/> REGIONS: CPT | Performed by: PHYSICAL THERAPIST

## 2022-05-19 NOTE — PROGRESS NOTES
Daily Note     Today's date: 2022  Patient name: Jim Pearson  : 1995  MRN: [id number]  Referring provider: Marisol Candelaria PA-C  Dx:   Encounter Diagnosis     ICD-10-CM    1  Acute right-sided low back pain with right-sided sciatica  M54 41        Start Time: 1515  Stop Time: 1615  Total time in clinic (min): 60 minutes    Subjective: Patient reports that he has felt a little better when performing his lumbar extension  Patient has noticed intermittent pain below his knee on the right  Objective: See treatment diary below      Assessment: Tolerated treatment well  Patient demonstrated fatigue post treatment and would benefit from continued PT  Patient challenged with progressions to strengthening and neuromuscular reeducation  Patient reported feeling better at end of session  Good understanding of current HEP  Plan: Continue per plan of care  Progress treatment as tolerated  Precautions: HTN      Manuals            Gr  II-IV central lumbar PA mobs  GR           R sciatic nerve glides  GR           R piriformis str  R piriformis pin and stretch  GR           Neuro Re-Ed             Supine sciatic nerve glides at 90/90 2x20 Reviewed           Behzad: Standing multifidus press  15# 10x10" ea  Behzad: Standing multifidus lifts  15# 10x10"           Standing multifidus chops             Quadruped alt UE/LE                                       Ther Ex             Patient education: postural education, pathophysiology, HEP             Warm-up  TM 10 min           PPU vs  Standing lumbar extension x10/hr Reviewed           Piriformis str  3x30" Reviewed           Figure-4 bridge  2x10 5" ea  Prone hip ext  2x10 5" ea             Clamshells                                       Ther Activity                                       Gait Training                                       Modalities

## 2022-05-24 NOTE — PROGRESS NOTES
Daily Note     Today's date: 2022  Patient name: Rufina Denver  : 1995  MRN: [id number]  Referring provider: Laz Vidales PA-C  Dx:   Encounter Diagnosis     ICD-10-CM    1  Acute right-sided low back pain with right-sided sciatica  M54 39        Start Time: 1530  Stop Time: 1620  Total time in clinic (min): 50 minutes    Subjective: Patient reports that his back feels a little better, but he hasn't done anything strenuous  Patient had some minor hip discomfort when stretching his piriformis  Minor tightness in his right lower leg currently  Objective: See treatment diary below      Assessment: Tolerated treatment well  Patient demonstrated fatigue post treatment and would benefit from continued PT  Patient challenged with progressions to treatment program, reporting increased fatigue/soreness  No pain provocation  Plan: Continue per plan of care  Progress treatment as tolerated  Precautions: HTN      Manuals           Gr  II-IV central lumbar PA mobs  GR GR          R sciatic nerve glides  GR GR          R piriformis str  R piriformis pin and stretch  GR           Neuro Re-Ed             Supine sciatic nerve glides at 90/90 2x20 Reviewed           [place]: Standing multifidus press  15# 10x10" ea  Behzad: Standing multifidus lifts  15# 10x10"           Standing multifidus chops             Quadruped alt UE/LE   10x3"          Multifidus walk-outs   15# 15x5" R ; 10x5" L                         Ther Ex             Patient education: postural education, pathophysiology, HEP   GR HEP review          Warm-up  TM 10 min TM 10 min          PPU vs  Standing lumbar extension x10/hr Reviewed           Piriformis str  3x30" Reviewed           Figure-4 bridge  2x10 5" ea  Prone hip ext  2x10 5" ea  Clamshells   10x10" ea                                      Ther Activity                                       Gait Training Modalities                                       Access Code: [email or web address]  URL: [email or web address]  Date: 05/25/2022  Prepared by: Jeanne Po    Exercises  · Prone Press Up - 5-8 x daily - 7 x weekly - 1 sets - 10 reps - 3 seconds hold  · Standing Lumbar Extension - 5-8 x daily - 7 x weekly - 1 sets - 10 reps - 3 seconds hold  · Supine Piriformis Stretch with Foot on Ground - 2 x daily - 7 x weekly - 3 sets - 1 reps - 30 seconds hold  · Supine Sciatic Nerve Glide - 2 x daily - 7 x weekly - 3 sets - 1 reps - 30 seconds hold  · Standing Anti-Rotation Press with Anchored Resistance - 1 x daily - 3 x weekly - 2 sets - 10 reps - 5 seconds hold  · Anti-Rotation Lateral Stepping with Press - 1 x daily - 4 x weekly - 2 sets - 10 reps - 5 seconds hold  · Reverse Chop with Resistance - 1 x daily - 4 x weekly - 2 sets - 10 reps - 5 seconds hold  · Figure 4 Bridge - 1 x daily - 4 x weekly - 2 sets - 10 reps - 5 seconds hold  · Bird Dog - 1 x daily - 4 x weekly - 2 sets - 10 reps - 5 seconds hold  · Prone Hip Extension - 1 x daily - 7 x weekly - 3 sets - 10 reps  · Clamshell - 1 x daily - 7 x weekly - 3 sets - 10 reps

## 2022-05-25 ENCOUNTER — OFFICE VISIT (OUTPATIENT)
Dept: PHYSICAL THERAPY | Facility: CLINIC | Age: 27
End: 2022-05-25
Payer: COMMERCIAL

## 2022-05-25 DIAGNOSIS — M54.41 ACUTE RIGHT-SIDED LOW BACK PAIN WITH RIGHT-SIDED SCIATICA: Primary | ICD-10-CM

## 2022-05-25 PROCEDURE — 97110 THERAPEUTIC EXERCISES: CPT | Performed by: PHYSICAL THERAPIST

## 2022-05-25 PROCEDURE — 97112 NEUROMUSCULAR REEDUCATION: CPT | Performed by: PHYSICAL THERAPIST

## 2022-05-25 PROCEDURE — 97140 MANUAL THERAPY 1/> REGIONS: CPT | Performed by: PHYSICAL THERAPIST

## 2022-06-08 ENCOUNTER — OFFICE VISIT (OUTPATIENT)
Dept: PHYSICAL THERAPY | Facility: CLINIC | Age: 27
End: 2022-06-08
Payer: COMMERCIAL

## 2022-06-08 DIAGNOSIS — M54.41 ACUTE RIGHT-SIDED LOW BACK PAIN WITH RIGHT-SIDED SCIATICA: Primary | ICD-10-CM

## 2022-06-08 PROCEDURE — 97112 NEUROMUSCULAR REEDUCATION: CPT | Performed by: PHYSICAL THERAPIST

## 2022-06-08 NOTE — PROGRESS NOTES
Daily Note     Today's date: 2022  Patient name: Wil Brandon  : 1995  MRN: [id number]  Referring provider: Ariane Bhandari PA-C  Dx:   Encounter Diagnosis     ICD-10-CM    1  Acute right-sided low back pain with right-sided sciatica  M54 39        Start Time: 1523  Stop Time: 1625  Total time in clinic (min): 62 minutes    Subjective: Patient reports that he was unable to perform his HEP as much as he would have liked while on vacation  Patient still gets sore at the end of his work day and is a little stiff in the morning  Patient feels close to back to his normal self  Patient would like to reduce frequency of visits to 2x/month to wean to HEP only  Objective: See treatment diary below      Assessment: Tolerated treatment well  Patient demonstrated fatigue post treatment and would benefit from continued PT  Patient challenged with progression to treatment program  Patient with core weakness as demonstrated by fair lumbar control during prone and side planks  Plan: Continue per plan of care  Progress note during next visit  Progress treatment as tolerated  Precautions: HTN      Manuals          Gr  II-IV central lumbar PA mobs  GR GR          R sciatic nerve glides  GR GR          R piriformis str  R piriformis pin and stretch  GR           Reassessment    NV         Neuro Re-Ed             Supine sciatic nerve glides at 90/90 2x20 Reviewed           [place]: Standing multifidus press  15# 10x10" ea  Behzad: Standing multifidus lifts  15# 10x10"           Standing multifidus chops             Quadruped alt UE/LE   10x3" 2x10 3"         Multifidus walk-outs   15# 15x5" R ; 10x5" L   15# 15x5" ea  Dead bug    2x10 5"         Prone planks    5x20"         Side planks    5x20" ea                        Ther Ex             Patient education: postural education, pathophysiology, HEP   GR HEP review          Warm-up  TM 10 min TM 10 min TM 10 min         PPU vs  Standing lumbar extension x10/hr Reviewed           Piriformis str  3x30" Reviewed           Figure-4 bridge  2x10 5" ea  Prone hip ext  2x10 5" ea  Clamshells   10x10" ea  Reviewed                                   Ther Activity                                       Gait Training                                       Modalities                                       Access Code: [email or web address]  URL: [email or web address]  Date: 05/25/2022  Prepared by: Seven Fossa    Exercises  · Prone Press Up - 5-8 x daily - 7 x weekly - 1 sets - 10 reps - 3 seconds hold  · Standing Lumbar Extension - 5-8 x daily - 7 x weekly - 1 sets - 10 reps - 3 seconds hold  · Supine Piriformis Stretch with Foot on Ground - 2 x daily - 7 x weekly - 3 sets - 1 reps - 30 seconds hold  · Supine Sciatic Nerve Glide - 2 x daily - 7 x weekly - 3 sets - 1 reps - 30 seconds hold  · Standing Anti-Rotation Press with Anchored Resistance - 1 x daily - 3 x weekly - 2 sets - 10 reps - 5 seconds hold  · Anti-Rotation Lateral Stepping with Press - 1 x daily - 4 x weekly - 2 sets - 10 reps - 5 seconds hold  · Reverse Chop with Resistance - 1 x daily - 4 x weekly - 2 sets - 10 reps - 5 seconds hold  · Figure 4 Bridge - 1 x daily - 4 x weekly - 2 sets - 10 reps - 5 seconds hold  · Bird Dog - 1 x daily - 4 x weekly - 2 sets - 10 reps - 5 seconds hold  · Prone Hip Extension - 1 x daily - 7 x weekly - 3 sets - 10 reps  · Clamshell - 1 x daily - 7 x weekly - 3 sets - 10 reps

## 2022-06-15 ENCOUNTER — APPOINTMENT (OUTPATIENT)
Dept: PHYSICAL THERAPY | Facility: CLINIC | Age: 27
End: 2022-06-15
Payer: COMMERCIAL

## 2022-06-29 ENCOUNTER — APPOINTMENT (OUTPATIENT)
Dept: PHYSICAL THERAPY | Facility: CLINIC | Age: 27
End: 2022-06-29
Payer: COMMERCIAL

## 2022-06-29 NOTE — PROGRESS NOTES
Daily Note     Today's date: 2022  Patient name: Maggie Weber  : 1995  MRN: [id number]  Referring provider: Scott Polk PA-C  Dx:   Encounter Diagnosis     ICD-10-CM    1  Acute right-sided low back pain with right-sided sciatica  M54 39                   Subjective: ***      Objective: See treatment diary below      Assessment: Tolerated treatment well  Patient demonstrated fatigue post treatment and would benefit from continued PT  Plan: Continue per plan of care  Progress treatment as tolerated  Precautions: HTN      Manuals         Gr  II-IV central lumbar PA mobs  GR GR          R sciatic nerve glides  GR GR          R piriformis str  R piriformis pin and stretch  GR           Reassessment    NV         Neuro Re-Ed             Supine sciatic nerve glides at 90/90 2x20 Reviewed           [place]: Standing multifidus press  15# 10x10" ea  Behzad: Standing multifidus lifts  15# 10x10"           Standing multifidus chops             Quadruped alt UE/LE   10x3" 2x10 3"         Multifidus walk-outs   15# 15x5" R ; 10x5" L   15# 15x5" ea  Dead bug    2x10 5"         Prone planks    5x20"         Side planks    5x20" ea  Ther Ex             Patient education: postural education, pathophysiology, HEP   GR HEP review          Warm-up  TM 10 min TM 10 min TM 10 min         PPU vs  Standing lumbar extension x10/hr Reviewed           Piriformis str  3x30" Reviewed           Figure-4 bridge  2x10 5" ea  Prone hip ext  2x10 5" ea  Clamshells   10x10" ea  Reviewed                                   Ther Activity                                       Gait Training                                       Modalities                                       Access Code: [email or web address]  URL: [email or web address]  Date: 2022  Prepared by: Henry Shipley    Exercises  · Prone Press Up - 5-8 x daily - 7 x weekly - 1 sets - 10 reps - 3 seconds hold  · Standing Lumbar Extension - 5-8 x daily - 7 x weekly - 1 sets - 10 reps - 3 seconds hold  · Supine Piriformis Stretch with Foot on Ground - 2 x daily - 7 x weekly - 3 sets - 1 reps - 30 seconds hold  · Supine Sciatic Nerve Glide - 2 x daily - 7 x weekly - 3 sets - 1 reps - 30 seconds hold  · Standing Anti-Rotation Press with Anchored Resistance - 1 x daily - 3 x weekly - 2 sets - 10 reps - 5 seconds hold  · Anti-Rotation Lateral Stepping with Press - 1 x daily - 4 x weekly - 2 sets - 10 reps - 5 seconds hold  · Reverse Chop with Resistance - 1 x daily - 4 x weekly - 2 sets - 10 reps - 5 seconds hold  · Figure 4 Bridge - 1 x daily - 4 x weekly - 2 sets - 10 reps - 5 seconds hold  · Bird Dog - 1 x daily - 4 x weekly - 2 sets - 10 reps - 5 seconds hold  · Prone Hip Extension - 1 x daily - 7 x weekly - 3 sets - 10 reps  · Clamshell - 1 x daily - 7 x weekly - 3 sets - 10 reps

## 2022-07-13 NOTE — PROGRESS NOTES
Yazan Santiago has attended a total of 4 physical therapy appointments to date  Patient was last treated on 6/8/22 and has no remaining appointments scheduled  Patient cancelled or no showed his past 3 appointments  Goals, objective and subjective information unable to be updated at this time  Patient will be discharged from physical therapy secondary to inactivity

## 2022-08-24 ENCOUNTER — OFFICE VISIT (OUTPATIENT)
Dept: FAMILY MEDICINE CLINIC | Facility: CLINIC | Age: 27
End: 2022-08-24
Payer: COMMERCIAL

## 2022-08-24 VITALS
OXYGEN SATURATION: 98 % | BODY MASS INDEX: 33 KG/M2 | WEIGHT: 271 LBS | HEART RATE: 72 BPM | DIASTOLIC BLOOD PRESSURE: 82 MMHG | SYSTOLIC BLOOD PRESSURE: 142 MMHG | TEMPERATURE: 97.4 F | HEIGHT: 76 IN

## 2022-08-24 DIAGNOSIS — M62.830 SPASM OF MUSCLE OF LOWER BACK: Primary | ICD-10-CM

## 2022-08-24 PROCEDURE — 99214 OFFICE O/P EST MOD 30 MIN: CPT | Performed by: PHYSICIAN ASSISTANT

## 2022-08-24 RX ORDER — METHOCARBAMOL 750 MG/1
750 TABLET, FILM COATED ORAL EVERY 8 HOURS PRN
Qty: 30 TABLET | Refills: 0 | Status: SHIPPED | OUTPATIENT
Start: 2022-08-24
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
## 2022-10-04 ENCOUNTER — APPOINTMENT (OUTPATIENT)
Dept: RADIOLOGY | Facility: CLINIC | Age: 27
End: 2022-10-04
Payer: COMMERCIAL

## 2022-10-04 ENCOUNTER — OFFICE VISIT (OUTPATIENT)
Dept: FAMILY MEDICINE CLINIC | Facility: CLINIC | Age: 27
End: 2022-10-04
Payer: COMMERCIAL

## 2022-10-04 VITALS
SYSTOLIC BLOOD PRESSURE: 131 MMHG | OXYGEN SATURATION: 96 % | HEART RATE: 70 BPM | DIASTOLIC BLOOD PRESSURE: 87 MMHG | TEMPERATURE: 97.5 F | BODY MASS INDEX: 33 KG/M2 | WEIGHT: 271 LBS | HEIGHT: 76 IN

## 2022-10-04 DIAGNOSIS — G89.29 CHRONIC BILATERAL LOW BACK PAIN WITHOUT SCIATICA: ICD-10-CM

## 2022-10-04 DIAGNOSIS — G89.29 CHRONIC BILATERAL LOW BACK PAIN WITHOUT SCIATICA: Primary | ICD-10-CM

## 2022-10-04 DIAGNOSIS — M54.50 CHRONIC BILATERAL LOW BACK PAIN WITHOUT SCIATICA: ICD-10-CM

## 2022-10-04 DIAGNOSIS — M54.50 CHRONIC BILATERAL LOW BACK PAIN WITHOUT SCIATICA: Primary | ICD-10-CM

## 2022-10-04 PROCEDURE — 72110 X-RAY EXAM L-2 SPINE 4/>VWS: CPT

## 2022-10-04 PROCEDURE — 99214 OFFICE O/P EST MOD 30 MIN: CPT | Performed by: PHYSICIAN ASSISTANT

## 2022-10-04 RX ORDER — NAPROXEN 500 MG/1
500 TABLET ORAL 2 TIMES DAILY WITH MEALS
Qty: 30 TABLET | Refills: 0 | Status: SHIPPED | OUTPATIENT
Start: 2022-10-04

## 2022-10-04 NOTE — PROGRESS NOTES
Name: Salvador Godinez      : 1995      MRN: [id number]  Encounter Provider: Zaida Hernandez PA-C  Encounter Date: 10/4/2022   Encounter department: [address] [place], [place] [address]     1  Chronic bilateral low back pain without sciatica  -     XR spine lumbar minimum 4 views non injury; Future; Expected date: 10/04/2022  -     MRI lumbar spine wo contrast; Future; Expected date: 10/04/2022  -     naproxen (Naprosyn) 500 mg tablet; Take 1 tablet (500 mg total) by mouth 2 (two) times a day with meals  chronic low back pain, no definitively responding to PT, HEP, conservative measures including heat, massage, nsaids  Will seek MRI at this time  Naproxen prn, continue home conservative measures  Further PT, vs ortho, vs pain management depending on imaging results  Subjective     Back Pain  This is a chronic problem  The current episode started more than 1 year ago  The problem occurs daily  The problem has been waxing and waning since onset  The quality of the pain is described as aching, stabbing and shooting  The pain radiates to the right thigh  The pain is moderate  The pain is worse during the day  The symptoms are aggravated by bending, sitting, standing and twisting  Stiffness is present all day  Pertinent negatives include no abdominal pain, bladder incontinence, bowel incontinence, dysuria, leg pain, numbness or weakness  He has tried analgesics, bed rest, heat, home exercises, NSAIDs and muscle relaxant (PT) for the symptoms  The treatment provided mild relief  Review of Systems   Constitutional: Negative  Respiratory: Negative  Gastrointestinal: Negative for abdominal pain and bowel incontinence  Genitourinary: Negative for bladder incontinence and dysuria  Musculoskeletal: Positive for back pain  Neurological: Negative for weakness and numbness         Past Medical History:   Diagnosis Date    Asthma     Hypertension      Past Surgical History: Procedure Laterality Date    MYRINGOTOMY W/ TUBES      NE OPEN TREATMENT CLAVICULAR FRACTURE INTERNAL FX Right 10/19/2020    Procedure: OPEN REDUCTION W/ INTERNAL FIXATION (ORIF) RIGHT CLAVICLE FRACTURE;  Surgeon: Kortney Kang MD;  [facility];  Service: Orthopedics    NE OPEN [address] Right 10/14/2020    Procedure: OPEN REDUCTION W/ INTERNAL FIXATION (ORIF) RIGHT SCAPHOID FRACTURE;  Surgeon: Shelia Rob MD;  [facility];  Service: Orthopedics    NE REMOVAL DEEP IMPLANT Right 5/3/2021    Procedure: REMOVAL HARDWARE RIGHT CLAVICLE [address];  Surgeon: Kortney Kang MD;  [facility];  Service: Orthopedics    WRIST SURGERY       Family History   Problem Relation Age of Onset    No Known Problems Mother     No Known Problems Father     Diabetes Maternal Aunt     Hypertension Maternal Aunt     Diabetes Maternal Grandmother     Hypertension Maternal Grandmother      Social History     Socioeconomic History    Marital status: Single     Spouse name: None    Number of children: None    Years of education: None    Highest education level: None   Occupational History    None   Tobacco Use    Smoking status: Never Smoker    Smokeless tobacco: Never Used   Vaping Use    Vaping Use: Never used   Substance and Sexual Activity    Alcohol use: Yes     Comment: OCCAS    Drug use: Never    Sexual activity: None   Other Topics Concern    None   Social History Narrative    None     Social Determinants of Health     Financial Resource Strain: Not on file   Food Insecurity: Not on file   Transportation Needs: Not on file   Physical Activity: Not on file   Stress: Not on file   Social Connections: Not on file   Intimate Partner Violence: Not on file   Housing Stability: Not on file     Current Outpatient Medications on File Prior to Visit   Medication Sig    methocarbamol (Robaxin-750) 750 mg tablet Take 1 tablet (750 mg total) by mouth every 8 (eight) hours as needed for muscle spasms    Multiple Vitamin (multivitamin) tablet Take 1 tablet by mouth daily       No Known Allergies  Immunization History   Administered Date(s) Administered    COVID-19 PFIZER VACCINE 0 3 ML IM 05/01/2021, 05/26/2021       Objective     /87   Pulse 70   Temp 97 5 °F (36 4 °C)   Ht 6' 4" (1 93 m)   Wt 123 kg (271 lb)   SpO2 96%   BMI 32 99 kg/m²     Physical Exam  Vitals and nursing note reviewed  Constitutional:       General: He is not in acute distress  Appearance: Normal appearance  He is not ill-appearing  HENT:      Head: Normocephalic and atraumatic  Pulmonary:      Effort: Pulmonary effort is normal  No respiratory distress  Musculoskeletal:      Cervical back: Normal range of motion  Lumbar back: No tenderness or bony tenderness  Normal range of motion  Negative right straight leg raise test and negative left straight leg raise test    Neurological:      Mental Status: He is alert and oriented to person, place, and time  Sensory: No sensory deficit  Motor: No weakness        Deep Tendon Reflexes: Reflexes normal        Michael Ramirez PA-C

## 2022-10-10 DIAGNOSIS — G89.29 CHRONIC BILATERAL LOW BACK PAIN WITHOUT SCIATICA: Primary | ICD-10-CM

## 2022-10-10 DIAGNOSIS — M54.50 CHRONIC BILATERAL LOW BACK PAIN WITHOUT SCIATICA: Primary | ICD-10-CM

## 2022-12-13 ENCOUNTER — OFFICE VISIT (OUTPATIENT)
Dept: FAMILY MEDICINE CLINIC | Facility: CLINIC | Age: 27
End: 2022-12-13

## 2022-12-13 VITALS
TEMPERATURE: 96.9 F | OXYGEN SATURATION: 97 % | SYSTOLIC BLOOD PRESSURE: 132 MMHG | WEIGHT: 277 LBS | DIASTOLIC BLOOD PRESSURE: 88 MMHG | HEIGHT: 76 IN | BODY MASS INDEX: 33.73 KG/M2 | HEART RATE: 72 BPM

## 2022-12-13 DIAGNOSIS — R03.0 ELEVATED BLOOD PRESSURE READING WITHOUT DIAGNOSIS OF HYPERTENSION: Primary | ICD-10-CM

## 2022-12-13 NOTE — PROGRESS NOTES
Name: Juan Erickson      : 1995      MRN: [id number]  Encounter Provider: Nghia Dockery PA-C  Encounter Date: 2022   Encounter department: [address] [place] [address]     1  Elevated blood pressure reading without diagnosis of hypertension  BP normotensive today  Recommend home monitoring with proper cuff size  No end organ complaints  Follow up prn  Goal <140/90       Subjective     Pt presents for BP check  Was at dentist and BP was in the 150s/90s so dental work was deferred  No hx of HTN  Few previous office readings above goal  No end organ complaints  BP today is 132/88    Review of Systems   Constitutional: Negative for chills, fatigue and fever  HENT: Negative for congestion, ear pain, hearing loss, nosebleeds, postnasal drip, rhinorrhea, sinus pressure, sinus pain, sneezing and sore throat  Eyes: Negative for pain, discharge, itching and visual disturbance  Respiratory: Negative for cough, chest tightness, shortness of breath and wheezing  Cardiovascular: Negative for chest pain, palpitations and leg swelling  Gastrointestinal: Negative for abdominal pain, blood in stool, constipation, diarrhea, nausea and vomiting  Genitourinary: Negative for frequency and urgency  Neurological: Negative for dizziness, light-headedness and numbness         Past Medical History:   Diagnosis Date   • Asthma    • Hypertension      Past Surgical History:   Procedure Laterality Date   • MYRINGOTOMY W/ TUBES     • MO OPEN TREATMENT CLAVICULAR FRACTURE INTERNAL FX Right 10/19/2020    Procedure: OPEN REDUCTION W/ INTERNAL FIXATION (ORIF) RIGHT CLAVICLE FRACTURE;  Surgeon: Darlyn Bolivar MD;  [facility];  Service: Orthopedics   • MO OPEN [address] Right 10/14/2020    Procedure: OPEN REDUCTION W/ INTERNAL FIXATION (ORIF) RIGHT SCAPHOID FRACTURE;  Surgeon: Alem Tomlin MD;  [facility];  Service: Orthopedics   • MO REMOVAL DEEP IMPLANT Right 5/3/2021    Procedure: REMOVAL HARDWARE RIGHT CLAVICLE HOOK PLATE;  Surgeon: Vidhya Figueredo MD;  [facility];  Service: Orthopedics   • WRIST SURGERY       Family History   Problem Relation Age of Onset   • No Known Problems Mother    • No Known Problems Father    • Diabetes Maternal Aunt    • Hypertension Maternal Aunt    • Diabetes Maternal Grandmother    • Hypertension Maternal Grandmother      Social History     Socioeconomic History   • Marital status: Single     Spouse name: None   • Number of children: None   • Years of education: None   • Highest education level: None   Occupational History   • None   Tobacco Use   • Smoking status: Never   • Smokeless tobacco: Never   Vaping Use   • Vaping Use: Never used   Substance and Sexual Activity   • Alcohol use: Yes     Comment: OCCAS   • Drug use: Never   • Sexual activity: None   Other Topics Concern   • None   Social History Narrative   • None     Social Determinants of Health     Financial Resource Strain: Not on file   Food Insecurity: Not on file   Transportation Needs: Not on file   Physical Activity: Not on file   Stress: Not on file   Social Connections: Not on file   Intimate Partner Violence: Not on file   Housing Stability: Not on file     Current Outpatient Medications on File Prior to Visit   Medication Sig   • methocarbamol (Robaxin-750) 750 mg tablet Take 1 tablet (750 mg total) by mouth every 8 (eight) hours as needed for muscle spasms   • Multiple Vitamin (multivitamin) tablet Take 1 tablet by mouth daily     • naproxen (Naprosyn) 500 mg tablet Take 1 tablet (500 mg total) by mouth 2 (two) times a day with meals     No Known Allergies  Immunization History   Administered Date(s) Administered   • COVID-19 PFIZER VACCINE 0 3 ML IM 05/01/2021, 05/26/2021       Objective     /88   Pulse 72   Temp (!) 96 9 °F (36 1 °C)   Ht 6' 4" (1 93 m)   Wt 126 kg (277 lb)   SpO2 97%   BMI 33 72 kg/m²     Physical Exam  Vitals and nursing note reviewed  Constitutional:       General: He is not in acute distress  Appearance: Normal appearance  HENT:      Head: Normocephalic and atraumatic  Nose: Nose normal       Mouth/Throat:      Mouth: Mucous membranes are moist       Pharynx: Oropharynx is clear  No oropharyngeal exudate or posterior oropharyngeal erythema  Eyes:      Pupils: Pupils are equal, round, and reactive to light  Cardiovascular:      Rate and Rhythm: Normal rate and regular rhythm  Heart sounds: Normal heart sounds  No murmur heard  Pulmonary:      Effort: Pulmonary effort is normal  No respiratory distress  Breath sounds: Normal breath sounds  No wheezing, rhonchi or rales  Musculoskeletal:         General: Normal range of motion  Cervical back: Normal range of motion and neck supple  Skin:     General: Skin is warm and dry  Neurological:      Mental Status: He is alert and oriented to person, place, and time     Psychiatric:         Mood and Affect: Mood and affect normal        Nghia Dockery PA-C

## 2023-01-17 ENCOUNTER — OFFICE VISIT (OUTPATIENT)
Dept: FAMILY MEDICINE CLINIC | Facility: CLINIC | Age: 28
End: 2023-01-17

## 2023-01-17 VITALS
WEIGHT: 285 LBS | DIASTOLIC BLOOD PRESSURE: 84 MMHG | TEMPERATURE: 97 F | HEIGHT: 76 IN | BODY MASS INDEX: 34.7 KG/M2 | HEART RATE: 68 BPM | SYSTOLIC BLOOD PRESSURE: 130 MMHG | OXYGEN SATURATION: 97 %

## 2023-01-17 DIAGNOSIS — Z13.1 SCREENING FOR DIABETES MELLITUS: ICD-10-CM

## 2023-01-17 DIAGNOSIS — M54.50 CHRONIC BILATERAL LOW BACK PAIN WITHOUT SCIATICA: Primary | ICD-10-CM

## 2023-01-17 DIAGNOSIS — I45.9 SKIPPED HEART BEATS: ICD-10-CM

## 2023-01-17 DIAGNOSIS — R03.0 ELEVATED BLOOD PRESSURE READING WITHOUT DIAGNOSIS OF HYPERTENSION: ICD-10-CM

## 2023-01-17 DIAGNOSIS — R82.998 FOAMY URINE: ICD-10-CM

## 2023-01-17 DIAGNOSIS — R00.2 PALPITATIONS: ICD-10-CM

## 2023-01-17 DIAGNOSIS — Z13.220 SCREENING FOR LIPID DISORDERS: ICD-10-CM

## 2023-01-17 DIAGNOSIS — G89.29 CHRONIC BILATERAL LOW BACK PAIN WITHOUT SCIATICA: Primary | ICD-10-CM

## 2023-01-17 NOTE — PROGRESS NOTES
Name: Jeanette Cruz      : 1995      MRN: [id number]  Encounter Provider: Farrah Kwon PA-C  Encounter Date: 2023   Encounter department: [address] [place] [address]     1  Chronic bilateral low back pain without sciatica    2  Skipped heart beats  -     TSH, 3rd generation with Free T4 reflex; Future  -     CBC and differential; Future    3  Screening for lipid disorders  -     Lipid Panel with Direct LDL reflex; Future    4  Screening for diabetes mellitus  -     Comprehensive metabolic panel; Future    5  Foamy urine  -     UA (URINE) with reflex to Scope    6  Elevated blood pressure reading without diagnosis of hypertension  normotensive today  Place holter  RRR on exam today  No associated cardiac complaints with the sensation of the heart skipping beats with exercise  Update labs, UA  Foamy urine without other urinary complaints  Monitor for proteinuria  Continue conservative measures for back pain  Continue routine follow ups  Subjective     Pt presents for follow up and a few questions  MRI review, low back pain, intermittent sciatic sx  MRI showed lumbar degenerative changes most pronounced at L4/5  No severe canal/foraminal stenosis  Pain reasonably well controlled with conservative measures  He questions that sometimes when he is greatly exerting himself during exercise he can feel his heart skip a beat "for a second"  No lightheadedness, CP, BINGHAM, syncope  No palpitations at rest      He also shares his urine has been "foamy" without other urinary sx  BP today on recheck is 130/84    Review of Systems   Constitutional: Negative for chills, fatigue and fever  HENT: Negative for congestion, ear pain, hearing loss, nosebleeds, postnasal drip, rhinorrhea, sinus pressure, sinus pain, sneezing and sore throat  Eyes: Negative for pain, discharge, itching and visual disturbance     Respiratory: Negative for cough, chest tightness, shortness of breath and wheezing  Cardiovascular: Positive for palpitations  Negative for chest pain and leg swelling  Gastrointestinal: Negative for abdominal pain, blood in stool, constipation, diarrhea, nausea and vomiting  Genitourinary: Negative for frequency and urgency  Foamy urine   Musculoskeletal: Positive for back pain  Neurological: Negative for dizziness, light-headedness and numbness         Past Medical History:   Diagnosis Date   • Asthma    • Hypertension      Past Surgical History:   Procedure Laterality Date   • MYRINGOTOMY W/ TUBES     • [place] OPEN TX CARPAL SCAPHOID NAVICULAR FRACTURE Right 10/14/2020    Procedure: OPEN REDUCTION W/ INTERNAL FIXATION (ORIF) RIGHT SCAPHOID FRACTURE;  Surgeon: Mk Resendiz MD;  [facility];  Service: Orthopedics   • PA OPEN TX CLAVICULAR FRACTURE INTERNAL FIXATION Right 10/19/2020    Procedure: OPEN REDUCTION W/ INTERNAL FIXATION (ORIF) RIGHT CLAVICLE FRACTURE;  Surgeon: Sasha Weinberg MD;  [facility];  Service: Orthopedics   • [place] REMOVAL IMPLANT DEEP Right 5/3/2021    Procedure: REMOVAL HARDWARE RIGHT CLAVICLE [address];  Surgeon: Sasha Weinberg MD;  [facility];  Service: Orthopedics   • WRIST SURGERY       Family History   Problem Relation Age of Onset   • No Known Problems Mother    • No Known Problems Father    • Diabetes Maternal Aunt    • Hypertension Maternal Aunt    • Diabetes Maternal Grandmother    • Hypertension Maternal Grandmother      Social History     Socioeconomic History   • Marital status: Single     Spouse name: None   • Number of children: None   • Years of education: None   • Highest education level: None   Occupational History   • None   Tobacco Use   • Smoking status: Never   • Smokeless tobacco: Never   Vaping Use   • Vaping Use: Never used   Substance and Sexual Activity   • Alcohol use: Yes     Comment: OCCAS   • Drug use: Never   • Sexual activity: None   Other Topics Concern   • None   Social History Narrative   • None     Social Determinants of Health     Financial Resource Strain: Not on file   Food Insecurity: Not on file   Transportation Needs: Not on file   Physical Activity: Not on file   Stress: Not on file   Social Connections: Not on file   Intimate Partner Violence: Not on file   Housing Stability: Not on file     Current Outpatient Medications on File Prior to Visit   Medication Sig   • methocarbamol (Robaxin-750) 750 mg tablet Take 1 tablet (750 mg total) by mouth every 8 (eight) hours as needed for muscle spasms   • Multiple Vitamin (multivitamin) tablet Take 1 tablet by mouth daily     • naproxen (Naprosyn) 500 mg tablet Take 1 tablet (500 mg total) by mouth 2 (two) times a day with meals     No Known Allergies  Immunization History   Administered Date(s) Administered   • COVID-19 PFIZER VACCINE 0 3 ML IM 05/01/2021, 05/26/2021       Objective     /84   Pulse 68   Temp (!) 97 °F (36 1 °C)   Ht 6' 4" (1 93 m)   Wt 129 kg (285 lb)   SpO2 97%   BMI 34 69 kg/m²     Physical Exam  Vitals and nursing note reviewed  Constitutional:       General: He is not in acute distress  Appearance: Normal appearance  HENT:      Head: Normocephalic and atraumatic  Nose: Nose normal       Mouth/Throat:      Mouth: Mucous membranes are moist       Pharynx: Oropharynx is clear  No oropharyngeal exudate or posterior oropharyngeal erythema  Eyes:      Pupils: Pupils are equal, round, and reactive to light  Cardiovascular:      Rate and Rhythm: Normal rate and regular rhythm  Heart sounds: Normal heart sounds  No murmur heard  Pulmonary:      Effort: Pulmonary effort is normal  No respiratory distress  Breath sounds: Normal breath sounds  No wheezing, rhonchi or rales  Musculoskeletal:         General: Normal range of motion  Cervical back: Normal range of motion and neck supple  Skin:     General: Skin is warm and dry     Neurological:      Mental Status: He is alert and oriented to person, place, and time     Psychiatric:         Mood and Affect: Mood and affect normal        Omega FENG Hurtado

## 2023-01-20 ENCOUNTER — CLINICAL SUPPORT (OUTPATIENT)
Dept: FAMILY MEDICINE CLINIC | Facility: CLINIC | Age: 28
End: 2023-01-20

## 2023-01-20 ENCOUNTER — APPOINTMENT (OUTPATIENT)
Dept: LAB | Facility: CLINIC | Age: 28
End: 2023-01-20

## 2023-01-20 DIAGNOSIS — I45.9 SKIPPED HEART BEATS: ICD-10-CM

## 2023-01-20 DIAGNOSIS — Z13.1 SCREENING FOR DIABETES MELLITUS: ICD-10-CM

## 2023-01-20 DIAGNOSIS — R00.2 PALPITATIONS: ICD-10-CM

## 2023-01-20 DIAGNOSIS — Z13.220 SCREENING FOR LIPID DISORDERS: ICD-10-CM

## 2023-01-20 LAB
ALBUMIN SERPL BCP-MCNC: 3.8 G/DL (ref 3.5–5)
ALP SERPL-CCNC: 102 U/L (ref 46–116)
ALT SERPL W P-5'-P-CCNC: 43 U/L (ref 12–78)
ANION GAP SERPL CALCULATED.3IONS-SCNC: 3 MMOL/L (ref 4–13)
AST SERPL W P-5'-P-CCNC: 23 U/L (ref 5–45)
BACTERIA UR QL AUTO: ABNORMAL /HPF
BASOPHILS # BLD AUTO: 0.08 THOUSANDS/ÂΜL (ref 0–0.1)
BASOPHILS NFR BLD AUTO: 1 % (ref 0–1)
BILIRUB SERPL-MCNC: 0.49 MG/DL (ref 0.2–1)
BILIRUB UR QL STRIP: NEGATIVE
BUN SERPL-MCNC: 13 MG/DL (ref 5–25)
CALCIUM SERPL-MCNC: 8.9 MG/DL (ref 8.3–10.1)
CHLORIDE SERPL-SCNC: 105 MMOL/L (ref 96–108)
CHOLEST SERPL-MCNC: 298 MG/DL
CLARITY UR: CLEAR
CO2 SERPL-SCNC: 29 MMOL/L (ref 21–32)
COLOR UR: ABNORMAL
CREAT SERPL-MCNC: 1.07 MG/DL (ref 0.6–1.3)
EOSINOPHIL # BLD AUTO: 0.28 THOUSAND/ÂΜL (ref 0–0.61)
EOSINOPHIL NFR BLD AUTO: 4 % (ref 0–6)
ERYTHROCYTE [DISTWIDTH] IN BLOOD BY AUTOMATED COUNT: 12 % (ref 11.6–15.1)
GFR SERPL CREATININE-BSD FRML MDRD: 93 ML/MIN/1.73SQ M
GLUCOSE P FAST SERPL-MCNC: 87 MG/DL (ref 65–99)
GLUCOSE UR STRIP-MCNC: NEGATIVE MG/DL
HCT VFR BLD AUTO: 46.8 % (ref 36.5–49.3)
HDLC SERPL-MCNC: 38 MG/DL
HGB BLD-MCNC: 15.9 G/DL (ref 12–17)
HGB UR QL STRIP.AUTO: NEGATIVE
IMM GRANULOCYTES # BLD AUTO: 0.02 THOUSAND/UL (ref 0–0.2)
IMM GRANULOCYTES NFR BLD AUTO: 0 % (ref 0–2)
KETONES UR STRIP-MCNC: NEGATIVE MG/DL
LDLC SERPL CALC-MCNC: 205 MG/DL (ref 0–100)
LEUKOCYTE ESTERASE UR QL STRIP: NEGATIVE
LYMPHOCYTES # BLD AUTO: 3.68 THOUSANDS/ÂΜL (ref 0.6–4.47)
LYMPHOCYTES NFR BLD AUTO: 51 % (ref 14–44)
MCH RBC QN AUTO: 28.9 PG (ref 26.8–34.3)
MCHC RBC AUTO-ENTMCNC: 34 G/DL (ref 31.4–37.4)
MCV RBC AUTO: 85 FL (ref 82–98)
MONOCYTES # BLD AUTO: 0.52 THOUSAND/ÂΜL (ref 0.17–1.22)
MONOCYTES NFR BLD AUTO: 7 % (ref 4–12)
MUCOUS THREADS UR QL AUTO: ABNORMAL
NEUTROPHILS # BLD AUTO: 2.74 THOUSANDS/ÂΜL (ref 1.85–7.62)
NEUTS SEG NFR BLD AUTO: 37 % (ref 43–75)
NITRITE UR QL STRIP: NEGATIVE
NON-SQ EPI CELLS URNS QL MICRO: ABNORMAL /HPF
NRBC BLD AUTO-RTO: 0 /100 WBCS
PH UR STRIP.AUTO: 5.5 [PH]
PLATELET # BLD AUTO: 238 THOUSANDS/UL (ref 149–390)
PMV BLD AUTO: 10 FL (ref 8.9–12.7)
POTASSIUM SERPL-SCNC: 4.7 MMOL/L (ref 3.5–5.3)
PROT SERPL-MCNC: 7.6 G/DL (ref 6.4–8.4)
PROT UR STRIP-MCNC: ABNORMAL MG/DL
RBC # BLD AUTO: 5.5 MILLION/UL (ref 3.88–5.62)
RBC #/AREA URNS AUTO: ABNORMAL /HPF
SODIUM SERPL-SCNC: 137 MMOL/L (ref 135–147)
SP GR UR STRIP.AUTO: 1.02 (ref 1–1.03)
TRIGL SERPL-MCNC: 275 MG/DL
TSH SERPL DL<=0.05 MIU/L-ACNC: 2.47 UIU/ML (ref 0.45–4.5)
UROBILINOGEN UR STRIP-ACNC: <2 MG/DL
WBC # BLD AUTO: 7.32 THOUSAND/UL (ref 4.31–10.16)
WBC #/AREA URNS AUTO: ABNORMAL /HPF

## 2023-01-23 ENCOUNTER — TELEPHONE (OUTPATIENT)
Dept: FAMILY MEDICINE CLINIC | Facility: CLINIC | Age: 28
End: 2023-01-23

## 2023-02-09 ENCOUNTER — OFFICE VISIT (OUTPATIENT)
Dept: FAMILY MEDICINE CLINIC | Facility: CLINIC | Age: 28
End: 2023-02-09

## 2023-02-09 VITALS
HEART RATE: 61 BPM | SYSTOLIC BLOOD PRESSURE: 130 MMHG | TEMPERATURE: 97.6 F | HEIGHT: 76 IN | OXYGEN SATURATION: 96 % | BODY MASS INDEX: 34.34 KG/M2 | DIASTOLIC BLOOD PRESSURE: 82 MMHG | WEIGHT: 282 LBS

## 2023-02-09 DIAGNOSIS — M54.50 CHRONIC BILATERAL LOW BACK PAIN WITHOUT SCIATICA: Primary | ICD-10-CM

## 2023-02-09 DIAGNOSIS — G89.29 CHRONIC BILATERAL LOW BACK PAIN WITHOUT SCIATICA: Primary | ICD-10-CM

## 2023-02-09 DIAGNOSIS — E78.2 MIXED HYPERLIPIDEMIA: ICD-10-CM

## 2023-02-09 NOTE — PROGRESS NOTES
Name: Osmel Kearney      : 1995      MRN: [id number]  Encounter Provider: Yessenia Townsend PA-C  Encounter Date: 2023   Encounter department: [address] [place] [address]     1  Chronic bilateral low back pain without sciatica    2  Mixed hyperlipidemia  -     Lipid Panel with Direct LDL reflex; Future  acute on chronic low back pain  Recommend continued conservative measures with rest, nsaids, muscle relaxers  Discussed need to strength hamstrings and strengthen core muscles  Discussed stretches and exercises  Discussed need for weight loss as well  2 month follow up  Check lipids prior  Earlier prn       Subjective     Pt presents with concerns of acute on chronic low back pain  Hx of chronic lumbar pain with occasional R sided sciatic sx  Managed conservatively with NSAIDs, muscle relaxers PT  MRI showed some mild degenerative changes  He was doing his routine exercises and stretches and feels he aggravated the injury  Continues with occasional shooting to R leg  Back Pain  This is a recurrent problem  The current episode started 1 to 4 weeks ago  The problem occurs daily  The problem has been waxing and waning since onset  The pain is present in the lumbar spine  The quality of the pain is described as aching, shooting and stabbing  The pain radiates to the right knee and right thigh  The pain is at a severity of 8/10  The pain is worse during the day  The symptoms are aggravated by bending, position and standing  Stiffness is present all day  Associated symptoms include leg pain  Pertinent negatives include no abdominal pain, bladder incontinence, bowel incontinence, chest pain, dysuria, fever, headaches, numbness, paresis, paresthesias, pelvic pain, perianal numbness, tingling, weakness or weight loss  Review of Systems   Constitutional: Negative for fever and weight loss  Cardiovascular: Negative for chest pain     Gastrointestinal: Negative for abdominal pain and bowel incontinence  Genitourinary: Negative for bladder incontinence, dysuria and pelvic pain  Musculoskeletal: Positive for back pain  Neurological: Negative for tingling, weakness, numbness, headaches and paresthesias         Past Medical History:   Diagnosis Date   • Asthma    • Hypertension      Past Surgical History:   Procedure Laterality Date   • MYRINGOTOMY W/ TUBES     • [place] OPEN TX CARPAL SCAPHOID NAVICULAR FRACTURE Right 10/14/2020    Procedure: OPEN REDUCTION W/ INTERNAL FIXATION (ORIF) RIGHT SCAPHOID FRACTURE;  Surgeon: Ketty Farmer MD;  [facility];  Service: Orthopedics   • [place] OPEN TX CLAVICULAR FRACTURE INTERNAL FIXATION Right 10/19/2020    Procedure: OPEN REDUCTION W/ INTERNAL FIXATION (ORIF) RIGHT CLAVICLE FRACTURE;  Surgeon: Fely Goins MD;  [facility];  Service: Orthopedics   • [place] REMOVAL IMPLANT DEEP Right 5/3/2021    Procedure: REMOVAL HARDWARE RIGHT CLAVICLE HOOK PLATE;  Surgeon: Fely Goins MD;  [facility];  Service: Orthopedics   • WRIST SURGERY       Family History   Problem Relation Age of Onset   • No Known Problems Mother    • No Known Problems Father    • Diabetes Maternal Aunt    • Hypertension Maternal Aunt    • Diabetes Maternal Grandmother    • Hypertension Maternal Grandmother      Social History     Socioeconomic History   • Marital status: Single     Spouse name: None   • Number of children: None   • Years of education: None   • Highest education level: None   Occupational History   • None   Tobacco Use   • Smoking status: Never   • Smokeless tobacco: Never   Vaping Use   • Vaping Use: Never used   Substance and Sexual Activity   • Alcohol use: Yes     Comment: OCCAS   • Drug use: Never   • Sexual activity: None   Other Topics Concern   • None   Social History Narrative   • None     Social Determinants of Health     Financial Resource Strain: Not on file   Food Insecurity: Not on file   Transportation Needs: Not on file   Physical Activity: Not on file   Stress: Not on file   Social Connections: Not on file   Intimate Partner Violence: Not on file   Housing Stability: Not on file     Current Outpatient Medications on File Prior to Visit   Medication Sig   • methocarbamol (Robaxin-750) 750 mg tablet Take 1 tablet (750 mg total) by mouth every 8 (eight) hours as needed for muscle spasms   • Multiple Vitamin (multivitamin) tablet Take 1 tablet by mouth daily     • naproxen (Naprosyn) 500 mg tablet Take 1 tablet (500 mg total) by mouth 2 (two) times a day with meals     No Known Allergies  Immunization History   Administered Date(s) Administered   • COVID-19 PFIZER VACCINE 0 3 ML IM 05/01/2021, 05/26/2021       Objective     /82   Pulse 61   Temp 97 6 °F (36 4 °C)   Ht 6' 4" (1 93 m)   Wt 128 kg (282 lb)   SpO2 96%   BMI 34 33 kg/m²     Physical Exam  Vitals and nursing note reviewed  Constitutional:       General: He is not in acute distress  Appearance: Normal appearance  HENT:      Head: Normocephalic and atraumatic  Pulmonary:      Effort: Pulmonary effort is normal  No respiratory distress  Musculoskeletal:      Lumbar back: No spasms or tenderness  Decreased range of motion  Negative right straight leg raise test and negative left straight leg raise test       Comments: Significant tightness of bilateral hamstrings   Skin:     General: Skin is warm and dry  Neurological:      Mental Status: He is alert and oriented to person, place, and time  Sensory: No sensory deficit  Motor: No weakness         Kelton Hurtado PA-C

## 2023-03-02 ENCOUNTER — TELEPHONE (OUTPATIENT)
Dept: FAMILY MEDICINE CLINIC | Facility: CLINIC | Age: 28
End: 2023-03-02

## 2023-06-16 ENCOUNTER — TELEPHONE (OUTPATIENT)
Dept: FAMILY MEDICINE CLINIC | Facility: CLINIC | Age: 28
End: 2023-06-16

## 2023-06-16 NOTE — TELEPHONE ENCOUNTER
Patient is calling and asking if you can give him a quick call to discuss paperwork before he drops it off, or emails-- preferred  Call in the afternoon, works overnight

## 2023-06-22 ENCOUNTER — TELEPHONE (OUTPATIENT)
Dept: FAMILY MEDICINE CLINIC | Facility: CLINIC | Age: 28
End: 2023-06-22

## 2023-06-22 NOTE — TELEPHONE ENCOUNTER
Good afternoon  My name is Belgica Walker and I called last week and spoke to someone, a member of your team, about getting in contact with my doctor, Doctor Fahad Lehman  I wanted to run a couple things by him and see if he would fill out some paperwork for me, but I need to speak to him before I can send the paperwork over  I spoke to someone and they said that he would give me a call back on Monday, but I haven't received anything  So I was just calling again today to see if I could get into contact with him again  My name is Belgica Walker  You know my number is [phone]  Thank you
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
## 2024-12-18 ENCOUNTER — OFFICE VISIT (OUTPATIENT)
Dept: FAMILY MEDICINE CLINIC | Facility: CLINIC | Age: 29
End: 2024-12-18
Payer: COMMERCIAL

## 2024-12-18 VITALS
TEMPERATURE: 97.8 F | OXYGEN SATURATION: 97 % | DIASTOLIC BLOOD PRESSURE: 88 MMHG | HEART RATE: 70 BPM | SYSTOLIC BLOOD PRESSURE: 138 MMHG | HEIGHT: 76 IN | WEIGHT: 258 LBS | BODY MASS INDEX: 31.42 KG/M2

## 2024-12-18 DIAGNOSIS — Z13.220 SCREENING FOR LIPID DISORDERS: ICD-10-CM

## 2024-12-18 DIAGNOSIS — G89.29 CHRONIC BILATERAL LOW BACK PAIN WITHOUT SCIATICA: Primary | ICD-10-CM

## 2024-12-18 DIAGNOSIS — Z13.1 SCREENING FOR DIABETES MELLITUS: ICD-10-CM

## 2024-12-18 DIAGNOSIS — M54.50 CHRONIC BILATERAL LOW BACK PAIN WITHOUT SCIATICA: Primary | ICD-10-CM

## 2024-12-18 PROCEDURE — 99213 OFFICE O/P EST LOW 20 MIN: CPT | Performed by: PHYSICIAN ASSISTANT

## 2024-12-18 NOTE — PROGRESS NOTES
Name: Vinicio Garza      : 1995      MRN: [id number]  Encounter Provider: Armando Lock PA-C  Encounter Date: 2024   Encounter department: [facility]    Assessment & Plan  Screening for lipid disorders    Orders:  •  Lipid Panel with Direct LDL reflex; Future    Screening for diabetes mellitus    Orders:  •  Comprehensive metabolic panel; Future    Chronic bilateral low back pain without sciatica  FMLA forms complete  Recommend continued otc pain relief, weight reduction and stretches/exercises to relieve low back pain            History of Present Illness     Pt presents to discuss FMLA for his chronic LBP. MRI 2022 demonstrating L4-5 disc bulge with mild canal stenosis and mild-mod lateral recess stenosis. Pt lost 30# and tries to treat pain conservatively with exercises and otc pain relief though his job requires physical labor and has pain by end of week     Back Pain  Pertinent negatives include no abdominal pain, chest pain, fever or numbness.     Review of Systems   Constitutional:  Negative for chills, fatigue and fever.   HENT:  Negative for congestion, ear pain, hearing loss, nosebleeds, postnasal drip, rhinorrhea, sinus pressure, sinus pain, sneezing and sore throat.    Eyes:  Negative for pain, discharge, itching and visual disturbance.   Respiratory:  Negative for cough, chest tightness, shortness of breath and wheezing.    Cardiovascular:  Negative for chest pain, palpitations and leg swelling.   Gastrointestinal:  Negative for abdominal pain, blood in stool, constipation, diarrhea, nausea and vomiting.   Musculoskeletal:  Positive for back pain.   Neurological:  Negative for dizziness, light-headedness and numbness.     Past Medical History:   Diagnosis Date   • Asthma    • Hypertension      Past Surgical History:   Procedure Laterality Date   • MYRINGOTOMY W/ TUBES     • [place] OPEN TX CARPAL SCAPHOID NAVICULAR FRACTURE Right 10/14/2020    Procedure: OPEN  "REDUCTION W/ INTERNAL FIXATION (ORIF) RIGHT SCAPHOID FRACTURE;  Surgeon: Spencer Trevizo MD;  [facility];  Service: Orthopedics   • [place] OPEN TX CLAVICULAR FRACTURE INTERNAL FIXATION Right 10/19/2020    Procedure: OPEN REDUCTION W/ INTERNAL FIXATION (ORIF) RIGHT CLAVICLE FRACTURE;  Surgeon: Naun Carvajal MD;  [facility];  Service: Orthopedics   • [place] REMOVAL IMPLANT DEEP Right 5/3/2021    Procedure: REMOVAL HARDWARE RIGHT CLAVICLE HOOK PLATE;  Surgeon: Naun Carvajal MD;  [facility];  Service: Orthopedics   • WRIST SURGERY       Family History   Problem Relation Age of Onset   • No Known Problems Mother    • No Known Problems Father    • Diabetes Maternal Aunt    • Hypertension Maternal Aunt    • Diabetes Maternal Grandmother    • Hypertension Maternal Grandmother      Social History     Tobacco Use   • Smoking status: Never   • Smokeless tobacco: Never   Vaping Use   • Vaping status: Never Used   Substance and Sexual Activity   • Alcohol use: Yes     Comment: OCCAS   • Drug use: Never   • Sexual activity: Not on file     Current Outpatient Medications on File Prior to Visit   Medication Sig   • Multiple Vitamin (multivitamin) tablet Take 1 tablet by mouth daily     • [DISCONTINUED] methocarbamol (Robaxin-750) 750 mg tablet Take 1 tablet (750 mg total) by mouth every 8 (eight) hours as needed for muscle spasms   • [DISCONTINUED] naproxen (Naprosyn) 500 mg tablet Take 1 tablet (500 mg total) by mouth 2 (two) times a day with meals     No Known Allergies  Immunization History   Administered Date(s) Administered   • COVID-19 PFIZER VACCINE 0.3 ML IM 05/01/2021, 05/26/2021, 12/21/2021     Objective   /88 (BP Location: Left arm, Patient Position: Sitting, Cuff Size: Large)   Pulse 70   Temp 97.8 °F (36.6 °C)   Ht 6' 4\" (1.93 m)   Wt 117 kg (258 lb)   SpO2 97%   BMI 31.40 kg/m²     Physical Exam  Vitals and nursing note reviewed.   Constitutional:       General: He is not in acute " distress.     Appearance: He is well-developed.   HENT:      Head: Normocephalic and atraumatic.   Cardiovascular:      Rate and Rhythm: Normal rate and regular rhythm.      Heart sounds: No murmur heard.  Pulmonary:      Effort: Pulmonary effort is normal. No respiratory distress.      Breath sounds: Normal breath sounds. No wheezing, rhonchi or rales.   Musculoskeletal:      Cervical back: Neck supple.      Lumbar back: No tenderness or bony tenderness. Normal range of motion.   Skin:     General: Skin is warm and dry.   Neurological:      Mental Status: He is alert.

## 2024-12-23 ENCOUNTER — TELEPHONE (OUTPATIENT)
Age: 29
End: 2024-12-23

## 2024-12-23 NOTE — TELEPHONE ENCOUNTER
Patient called stating his employer for medical leave said they need additional information filled out on the recent paperwork.     On page 3 question #6 they also want Armando Lock to advise how much time the patient would have to take off from work for his appts. Ex: would he have to take off an entire shift?    They also asked once that new information is completed to have Armando Lock initial and date it.     Once that information is included please refax only that page.

## 2024-12-31 NOTE — TELEPHONE ENCOUNTER
Patient calling to check the status of his paperwork being completed.  Spoke with office clinical, they are reprinting the forms so that Armando can make his correction and will be faxing over today.  Patient voiced understanding.

## (undated) DEVICE — OCCLUSIVE GAUZE STRIP,3% BISMUTH TRIBROMOPHENATE IN PETROLATUM BLEND: Brand: XEROFORM

## (undated) DEVICE — CHLORAPREP HI-LITE 26ML ORANGE

## (undated) DEVICE — SUT ETHIBOND 3-0 RB-1 30 IN MX552

## (undated) DEVICE — LIGHT HANDLE COVER SLEEVE DISP BLUE STELLAR

## (undated) DEVICE — GAUZE SPONGES,USP TYPE VII GAUZE, 12 PLY: Brand: CURITY

## (undated) DEVICE — .045" X 6" ST GUIDE WIRE
Type: IMPLANTABLE DEVICE | Site: WRIST | Status: NON-FUNCTIONAL
Brand: ACUMED
Removed: 2020-10-14

## (undated) DEVICE — BANDAGE, ESMARK LF STR 6"X9' (20/CS): Brand: CYPRESS

## (undated) DEVICE — PAD GROUNDING ADULT

## (undated) DEVICE — MINI ACUTRAK 2® DRILL, LONG: Brand: ACUMED

## (undated) DEVICE — GLOVE SRG BIOGEL ORTHOPEDIC 7

## (undated) DEVICE — DRAPE SHEET THREE QUARTER

## (undated) DEVICE — DISPOSABLE EQUIPMENT COVER: Brand: SMALL TOWEL DRAPE

## (undated) DEVICE — PENCIL ELECTROSURG E-Z CLEAN -0035H

## (undated) DEVICE — SUT VICRYL 2-0 CT-1 27 IN J259H

## (undated) DEVICE — GLOVE INDICATOR PI UNDERGLOVE SZ 7 BLUE

## (undated) DEVICE — DRAPE EQUIPMENT RF WAND

## (undated) DEVICE — ARM SLING: Brand: DEROYAL

## (undated) DEVICE — STRETCH BANDAGE: Brand: CURITY

## (undated) DEVICE — SPONGE SCRUB 4 PCT CHLORHEXIDINE

## (undated) DEVICE — PACK MAJOR ORTHO W/SPLITS PBDS

## (undated) DEVICE — GAUZE SPONGES,16 PLY: Brand: CURITY

## (undated) DEVICE — COBAN 4 IN STERILE

## (undated) DEVICE — SUT ETHILON 4-0 PS-2 18 IN 1667H

## (undated) DEVICE — DRAPE C-ARM X-RAY

## (undated) DEVICE — GLOVE SRG BIOGEL 8

## (undated) DEVICE — GLOVE SRG BIOGEL ORTHOPEDIC 8

## (undated) DEVICE — 3M™ STERI-DRAPE™ U-DRAPE 1015: Brand: STERI-DRAPE™

## (undated) DEVICE — SUT ETHILON 3-0 PS-1 18 IN 1663H

## (undated) DEVICE — INTENDED FOR TISSUE SEPARATION, AND OTHER PROCEDURES THAT REQUIRE A SHARP SURGICAL BLADE TO PUNCTURE OR CUT.: Brand: BARD-PARKER ® CARBON RIB-BACK BLADES

## (undated) DEVICE — ELECTRODE BLADE MOD E-Z CLEAN 2.5IN 6.4CM -0012M

## (undated) DEVICE — TUBING SUCTION 5MM X 12 FT

## (undated) DEVICE — 3M™ TEGADERM™ TRANSPARENT FILM DRESSING FRAME STYLE, 1627, 4 IN X 10 IN (10 CM X 25 CM), 20/CT 4CT/CASE: Brand: 3M™ TEGADERM™

## (undated) DEVICE — INTENDED FOR TISSUE SEPARATION, AND OTHER PROCEDURES THAT REQUIRE A SHARP SURGICAL BLADE TO PUNCTURE OR CUT.: Brand: BARD-PARKER SAFETY BLADES SIZE 15, STERILE

## (undated) DEVICE — NEEDLE 25G X 1 1/2

## (undated) DEVICE — DRAPE C-ARMOUR

## (undated) DEVICE — DRAPE KIT C-ARM W/PLATE PRTC FOOT SWITCH COVER

## (undated) DEVICE — PAD CAST 3 IN COTTON NON STRL

## (undated) DEVICE — STERILE BETHLEHEM PLASTIC HAND: Brand: CARDINAL HEALTH

## (undated) DEVICE — CUFF TOURNIQUET 18 X 4 IN QUICK CONNECT DISP 1 BLADDER

## (undated) DEVICE — 1.6MM KIRSCHNER WIRE W/TROCAR POINT 150MM
Type: IMPLANTABLE DEVICE | Site: CLAVICLE | Status: NON-FUNCTIONAL
Removed: 2020-10-19

## (undated) DEVICE — DRESSING MEPILEX AG BORDER 4 X 12 IN

## (undated) DEVICE — 2.8MM PERCUTANEOUS DRILL BIT F/LCP® PL QC/200MM/100MM CALIB: Brand: LCP

## (undated) DEVICE — SPLINT 4 X 15 IN FAST SET PLASTER

## (undated) DEVICE — DRESSING MEPILEX AG BORDER 4 X 10 IN

## (undated) DEVICE — 3M™ TEGADERM™ TRANSPARENT FILM DRESSING FRAME STYLE, 1626W, 4 IN X 4-3/4 IN (10 CM X 12 CM), 50/CT 4CT/CASE: Brand: 3M™ TEGADERM™

## (undated) DEVICE — 3M™ STERI-STRIP™ REINFORCED ADHESIVE SKIN CLOSURES, R1547, 1/2 IN X 4 IN (12 MM X 100 MM), 6 STRIPS/ENVELOPE: Brand: 3M™ STERI-STRIP™

## (undated) DEVICE — ACE WRAP 3 IN STERILE

## (undated) DEVICE — 1.25MM KIRSCHNER WIRE W/TROCAR POINT 150MM
Type: IMPLANTABLE DEVICE | Site: CLAVICLE | Status: NON-FUNCTIONAL
Removed: 2020-10-19

## (undated) DEVICE — IMPERVIOUS STOCKINETTE: Brand: DEROYAL

## (undated) DEVICE — CURITY NON-ADHERENT STRIPS: Brand: CURITY

## (undated) DEVICE — SUT VICRYL 0 CT-1 27 IN J260H

## (undated) DEVICE — 2.5MM DRILL BIT/QC/GOLD/110MM

## (undated) DEVICE — SUT VICRYL 0 CT 36 IN J958H

## (undated) DEVICE — PLUMEPEN PRO 10FT